# Patient Record
Sex: MALE | Race: WHITE | Employment: FULL TIME | URBAN - METROPOLITAN AREA
[De-identification: names, ages, dates, MRNs, and addresses within clinical notes are randomized per-mention and may not be internally consistent; named-entity substitution may affect disease eponyms.]

---

## 2018-06-27 ENCOUNTER — HOSPITAL ENCOUNTER (OUTPATIENT)
Dept: MRI IMAGING | Age: 53
Discharge: HOME | End: 2018-06-27
Attending: ORTHOPAEDIC SURGERY

## 2018-06-27 DIAGNOSIS — M47.892 OTHER SPONDYLOSIS, CERVICAL REGION: ICD-10-CM

## 2018-06-27 DIAGNOSIS — M54.12 BRACHIAL NEURITIS: ICD-10-CM

## 2018-07-08 ENCOUNTER — APPOINTMENT (OUTPATIENT)
Dept: CT IMAGING | Age: 53
End: 2018-07-08
Attending: EMERGENCY MEDICINE
Payer: COMMERCIAL

## 2018-07-08 ENCOUNTER — HOSPITAL ENCOUNTER (EMERGENCY)
Age: 53
Discharge: HOME | End: 2018-07-08
Attending: EMERGENCY MEDICINE | Admitting: EMERGENCY MEDICINE
Payer: COMMERCIAL

## 2018-07-08 VITALS
HEART RATE: 95 BPM | TEMPERATURE: 98.1 F | WEIGHT: 153 LBS | DIASTOLIC BLOOD PRESSURE: 76 MMHG | RESPIRATION RATE: 16 BRPM | OXYGEN SATURATION: 95 % | BODY MASS INDEX: 24.59 KG/M2 | HEIGHT: 66 IN | SYSTOLIC BLOOD PRESSURE: 119 MMHG

## 2018-07-08 DIAGNOSIS — R51.9 ACUTE NONINTRACTABLE HEADACHE, UNSPECIFIED HEADACHE TYPE: Primary | ICD-10-CM

## 2018-07-08 PROCEDURE — 70450 CT HEAD/BRAIN W/O DYE: CPT

## 2018-07-08 PROCEDURE — 96374 THER/PROPH/DIAG INJ IV PUSH: CPT

## 2018-07-08 PROCEDURE — 99284 EMERGENCY DEPT VISIT MOD MDM: CPT

## 2018-07-08 PROCEDURE — 96361 HYDRATE IV INFUSION ADD-ON: CPT

## 2018-07-08 PROCEDURE — 96375 TX/PRO/DX INJ NEW DRUG ADDON: CPT

## 2018-07-08 PROCEDURE — 74011250636 HC RX REV CODE- 250/636: Performed by: EMERGENCY MEDICINE

## 2018-07-08 RX ORDER — KETOROLAC TROMETHAMINE 30 MG/ML
30 INJECTION, SOLUTION INTRAMUSCULAR; INTRAVENOUS
Status: COMPLETED | OUTPATIENT
Start: 2018-07-08 | End: 2018-07-08

## 2018-07-08 RX ORDER — DIPHENHYDRAMINE HYDROCHLORIDE 50 MG/ML
25 INJECTION, SOLUTION INTRAMUSCULAR; INTRAVENOUS
Status: COMPLETED | OUTPATIENT
Start: 2018-07-08 | End: 2018-07-08

## 2018-07-08 RX ORDER — METOCLOPRAMIDE HYDROCHLORIDE 5 MG/ML
10 INJECTION INTRAMUSCULAR; INTRAVENOUS
Status: COMPLETED | OUTPATIENT
Start: 2018-07-08 | End: 2018-07-08

## 2018-07-08 RX ORDER — DEXAMETHASONE SODIUM PHOSPHATE 4 MG/ML
10 INJECTION, SOLUTION INTRA-ARTICULAR; INTRALESIONAL; INTRAMUSCULAR; INTRAVENOUS; SOFT TISSUE
Status: COMPLETED | OUTPATIENT
Start: 2018-07-08 | End: 2018-07-08

## 2018-07-08 RX ADMIN — KETOROLAC TROMETHAMINE 30 MG: 30 INJECTION INTRAMUSCULAR; INTRAVENOUS at 16:06

## 2018-07-08 RX ADMIN — DEXAMETHASONE SODIUM PHOSPHATE 10 MG: 4 INJECTION, SOLUTION INTRAMUSCULAR; INTRAVENOUS at 15:06

## 2018-07-08 RX ADMIN — DIPHENHYDRAMINE HYDROCHLORIDE 25 MG: 50 INJECTION, SOLUTION INTRAMUSCULAR; INTRAVENOUS at 15:04

## 2018-07-08 RX ADMIN — METOCLOPRAMIDE 10 MG: 5 INJECTION, SOLUTION INTRAMUSCULAR; INTRAVENOUS at 15:02

## 2018-07-08 RX ADMIN — SODIUM CHLORIDE 1000 ML: 900 INJECTION, SOLUTION INTRAVENOUS at 15:01

## 2018-07-08 NOTE — DISCHARGE INSTRUCTIONS

## 2018-07-08 NOTE — ED PROVIDER NOTES
HPI Comments: 46 y.o. male with no significant past medical history who presents via private vehicle from home with chief complaint of a headache. Patient reports constant, \"3/10\" headache since 6/29/18 - some relief when supine. Patient has seen his PCP for the same and states he has a CT and MRI scheduled for Friday (7/13/18), but states that's \"too long to wait. \" Patient states he is concerned because he \"never gets headaches. \" Patient is compliant with [facility] through [facility] for recent neck issues. Patient also notes receiving a \"shot\" for neck inflammation at his PCP yesterday (7/7/18) for related pain, and notes some relief. Patient endorses Hx of arthritis, but states a recent medication change has it \"under control. \" Patient denies recent surgical procedures. Patient denies numbness, tingling, fever, and chills. There are no other acute medical concerns at this time. PCP: Mavis Galvan MD    Note written by Abril Nieves, as dictated by Sim Barger. Hilton Moreira MD 2:42 PM    The history is provided by the patient. No  was used. No past medical history on file. No past surgical history on file. No family history on file. Social History     Social History    Marital status: LEGALLY      Spouse name: N/A    Number of children: N/A    Years of education: N/A     Occupational History    Not on file. Social History Main Topics    Smoking status: Not on file    Smokeless tobacco: Not on file    Alcohol use Not on file    Drug use: Not on file    Sexual activity: Not on file     Other Topics Concern    Not on file     Social History Narrative         ALLERGIES: Review of patient's allergies indicates no known allergies. Review of Systems   Constitutional: Negative for chills and fever. HENT: Negative for ear pain and sore throat. Eyes: Negative for pain. Respiratory: Negative for chest tightness and shortness of breath. Cardiovascular: Negative for chest pain and leg swelling. Gastrointestinal: Negative for abdominal pain, nausea and vomiting. Genitourinary: Negative for dysuria and flank pain. Musculoskeletal: Positive for neck pain and neck stiffness. Negative for back pain. Skin: Negative for rash. Neurological: Positive for headaches. Negative for numbness. All other systems reviewed and are negative. Vitals:    07/08/18 1424   BP: (!) 140/110   Pulse: 95   Resp: 16   Temp: 98.1 °F (36.7 °C)   SpO2: 96%   Weight: 69.4 kg (153 lb)   Height: 5' 6\" (1.676 m)            Physical Exam   Constitutional: No distress. HENT:   Head: Normocephalic and atraumatic. Eyes: Conjunctivae are normal. Pupils are equal, round, and reactive to light. No scleral icterus. Neck: No tracheal deviation present. Cardiovascular: Normal rate and regular rhythm. Pulmonary/Chest: Effort normal and breath sounds normal. No stridor. No respiratory distress. Abdominal: Soft. He exhibits no distension. There is no tenderness. Musculoskeletal: He exhibits no edema or deformity. Neurological: He is alert. Skin: Skin is warm and dry. Psychiatric: He has a normal mood and affect. Nursing note and vitals reviewed. Note written by Abril Montgomery, as dictated by Tatum Saini. Mahsa Plascencia MD 2:42 PM    MDM  Number of Diagnoses or Management Options  Acute nonintractable headache, unspecified headache type:   Diagnosis management comments: 79-year-old male presents with several days of a headache with no red flags. Had a CT scan ordered by his primary care doctor Will obtain here. -CT unremarkable. -Given migraine cocktail with relief of his headache.  -Referred to neurology. Patient Progress  Patient progress: resolved        ED Course       Procedures      Tatum Saini.  Mahsa Plascencia MD

## 2018-07-08 NOTE — ED TRIAGE NOTES
Patient c/o headache since the end of June, states he has seen his primary care doctor for the same, states he has a ct scan of the head and mri of the neck scheduled for Friday but states that's too long for him to wait.

## 2018-07-19 ENCOUNTER — OFFICE VISIT (OUTPATIENT)
Dept: NEUROLOGY | Age: 53
End: 2018-07-19

## 2018-07-19 VITALS
HEIGHT: 66 IN | WEIGHT: 153 LBS | BODY MASS INDEX: 24.59 KG/M2 | DIASTOLIC BLOOD PRESSURE: 92 MMHG | OXYGEN SATURATION: 95 % | HEART RATE: 89 BPM | SYSTOLIC BLOOD PRESSURE: 138 MMHG

## 2018-07-19 DIAGNOSIS — G44.201 ACUTE INTRACTABLE TENSION-TYPE HEADACHE: Primary | ICD-10-CM

## 2018-07-19 RX ORDER — INFLIXIMAB 100 MG/10ML
5 INJECTION, POWDER, LYOPHILIZED, FOR SOLUTION INTRAVENOUS ONCE
COMMUNITY
End: 2022-07-01

## 2018-07-19 NOTE — MR AVS SNAPSHOT
[address] [place] [address] [address] [facility] [id number] [phone] Patient: Steffi Cardenas MRN: [id number] :1965 Visit Information Date & Time Provider Department Dept. Phone Encounter #  
 2018  8:00 AM Long Kline MD [facility]rology [facility] [phone] [id number] Follow-up Instructions Return if symptoms worsen or fail to improve. Upcoming Health Maintenance Date Due Hepatitis C Screening 1965 DTaP/Tdap/Td series (1 - Tdap) 1985 FOBT Q 1 YEAR AGE 50-75 2014 Influenza Age 5 to Adult 2018 Allergies as of 2018  Review Complete On: 2018 By: Kadeem Aguilar LPN No Known Allergies Current Immunizations  Never Reviewed No immunizations on file. Not reviewed this visit You Were Diagnosed With   
  
 Codes Comments Acute intractable tension-type headache    -  Primary ICD-10-CM: C30.795 ICD-9-CM: 339.10 Vitals BP Pulse Height(growth percentile) Weight(growth percentile) SpO2 BMI  
 (!) 138/92 89 5' 6\" (1.676 m) 153 lb (69.4 kg) 95% 24.69 kg/m2 Smoking Status Never Smoker Vitals History BMI and BSA Data Body Mass Index Body Surface Area  
 24.69 kg/m 2 1.8 m 2 Preferred Pharmacy Pharmacy Name Phone [facility] [address], [address] [place] [address] [phone] Your Updated Medication List  
  
   
This list is accurate as of 18  8:45 AM.  Always use your most recent med list.  
  
  
  
  
 REMICADE 100 mg injection Generic drug:  inFLIXimab  
5 mg/kg by IntraVENous route once. Follow-up Instructions Return if symptoms worsen or fail to improve. Introducing [facility]! Dear Nayan Fox: Thank you for requesting a [employer] account.   Our records indicate that you already have an active [employer] account. You can access your account anytime at [email or web address] Did you know that you can access your hospital and ER discharge instructions at any time in [employer]? You can also review all of your test results from your hospital stay or ER visit. Additional Information If you have questions, please visit the Frequently Asked Questions section of the [employer] website at [email or web address] Remember, [employer] is NOT to be used for urgent needs. For medical emergencies, dial 911. Now available from your iPhone and Android! Please provide this summary of care documentation to your next provider. Your primary care clinician is listed as Martyn Buerger. If you have any questions after today's visit, please call [phone].

## 2018-07-19 NOTE — PROGRESS NOTES
Name: Mandie Simmons      :  1965    PCP:   Lisa Stewart MD      Referring:  Devika Blount, ER/ [facility]  MRN:   [id number]    Chief Complaint:   Chief Complaint   Patient presents with    Headache       HISTORY OF PRESENT ILLNESS:     This is a 46 y.o. male with hx of psoriatic arthritis, who presents for evaluation of new onset headache. Pt reports that he started getting frequent headaches at the end of , after getting into a tense discussion with his doctoral advisor. He also describes other recent stressors (recent divorce, relocation from [place] to [place]). Describes head pain as across upper neck/ base of head, or across forehead and around/ behind eyes. Has hard time describing pain. Might feel tight at times. No vomiting, only 1 episode of nausea, no light sensitivity, no sound sensitivity. Duration: whole day. Better/ goes away when he lies down. Tried Diclofenac and that seemed more helpful than flexeril. He also describes some neck pain and left shoulder pain that's been present since around 2018 and he's working with PT for that. Reviewed recent CT Head images (18): normal.       Complete Review of Systems: + frequent headaches, joint pian, snoring; otherwise as noted in HPI     No Known Allergies  History reviewed. No pertinent past medical history. History reviewed. No pertinent surgical history. Family History   Problem Relation Age of Onset    Heart Disease Mother      Social History     Social History    Marital status: LEGALLY      Spouse name: N/A    Number of children: N/A    Years of education: N/A     Occupational History    Not on file.      Social History Main Topics    Smoking status: Never Smoker    Smokeless tobacco: Never Used    Alcohol use Not on file    Drug use: Not on file    Sexual activity: Not on file     Other Topics Concern    Not on file     Social History Narrative    No narrative on file       PHYSICAL EXAM  Vitals: 07/19/18 0811   BP: (!) 138/92   Pulse: 89   SpO2: 95%   Weight: 69.4 kg (153 lb)   Height: 5' 6\" (1.676 m)       General:  Alert, cooperative, NAD   Head:  Normocephalic, atraumatic. Eyes:  Conjunctivae/corneas clear   Lungs:  Heart:  Non labored breathing  Regular rate, rhythm   Extremities: No edema. Skin: No rashes    Neurologic Exam       Language: normal  Memory:  Alert, oriented to person, place, situation    Cranial Nerves:  I: smell Not tested   II: visual fields Full to confrontation   II: pupils Equal, round, reactive to light   II: optic disc No papilledema   III,VII: ptosis none   III,IV,VI: extraocular muscles  normal   V: facial light touch sensation  normal   VII: facial muscle function  symmetric   VIII: hearing symmetric   IX: soft palate elevation  normal   XI: sternocleidomastoid strength 5/5   XII: tongue  midline      Motor: normal bulk, tone, strength in all exts  Sensory: intact to LT, PP, temp, vibration x 4 exts   Cerebellar: no rest, postural, or intention tremor  Normal FNF and H-Shin bilaterally  Reflexes: 2+ throughout  Plantar response: neutral bilaterally    Gait: normal gait including tandem  Romberg negative     Reviewed recent ER note from 7-8-18    ASSESSMENT AND PLAN    ICD-10-CM ICD-9-CM    1. Acute intractable tension-type headache G44.201 339.10        D/w patient CT head normal, neurologic exam normal, and history is consistent with tension headache from ongoing stressors. No features to suggest migraine or any concerning intracranial abnormalities. He reported that his blood pressure has been elevated for a while but hasn't been started on HTN meds. I advised him to f/u with PCP regarding the HTN and if he needs to start on a med, then beta-blocker might help reduce headache frequency in addition to controlling blood pressure. No regular neurology follow up needed. Welcome to return for any new neurologic issues.      Signed By: Chau Swain MD     July 19, 2018

## 2018-08-09 ENCOUNTER — HOSPITAL ENCOUNTER (OUTPATIENT)
Dept: MRI IMAGING | Age: 53
Discharge: HOME | End: 2018-08-09
Attending: ORTHOPAEDIC SURGERY
Payer: COMMERCIAL

## 2018-08-09 DIAGNOSIS — M54.12 CERVICAL RADICULITIS: ICD-10-CM

## 2018-08-09 DIAGNOSIS — M75.41 IMPINGEMENT SYNDROME OF RIGHT SHOULDER: ICD-10-CM

## 2018-08-09 DIAGNOSIS — M67.912 DISORDER OF ROTATOR CUFF OF BOTH SHOULDERS: ICD-10-CM

## 2018-08-09 DIAGNOSIS — M47.892 OTHER SPONDYLOSIS, CERVICAL REGION: ICD-10-CM

## 2018-08-09 DIAGNOSIS — M67.911 DISORDER OF ROTATOR CUFF OF BOTH SHOULDERS: ICD-10-CM

## 2018-08-09 PROCEDURE — 72141 MRI NECK SPINE W/O DYE: CPT

## 2018-08-09 PROCEDURE — 73221 MRI JOINT UPR EXTREM W/O DYE: CPT

## 2018-10-31 ENCOUNTER — HOSPITAL ENCOUNTER (OUTPATIENT)
Dept: MRI IMAGING | Age: 53
Discharge: HOME | End: 2018-10-31
Attending: ORTHOPAEDIC SURGERY
Payer: COMMERCIAL

## 2018-10-31 DIAGNOSIS — M75.32 CALCIFIC TENDINITIS OF LEFT SHOULDER: ICD-10-CM

## 2018-10-31 DIAGNOSIS — M67.912 DISORDER OF LEFT ROTATOR CUFF: ICD-10-CM

## 2018-10-31 DIAGNOSIS — M19.012 ARTHRITIS OF LEFT SHOULDER REGION: ICD-10-CM

## 2018-10-31 PROCEDURE — 73221 MRI JOINT UPR EXTREM W/O DYE: CPT

## 2020-04-15 ENCOUNTER — HOSPITAL ENCOUNTER (EMERGENCY)
Age: 55
Discharge: HOME | End: 2020-04-15
Attending: EMERGENCY MEDICINE | Admitting: EMERGENCY MEDICINE
Payer: COMMERCIAL

## 2020-04-15 ENCOUNTER — APPOINTMENT (OUTPATIENT)
Dept: CT IMAGING | Age: 55
End: 2020-04-15
Attending: EMERGENCY MEDICINE
Payer: COMMERCIAL

## 2020-04-15 VITALS
DIASTOLIC BLOOD PRESSURE: 93 MMHG | HEART RATE: 93 BPM | SYSTOLIC BLOOD PRESSURE: 162 MMHG | HEIGHT: 66 IN | RESPIRATION RATE: 18 BRPM | TEMPERATURE: 97.7 F | OXYGEN SATURATION: 99 % | WEIGHT: 160 LBS | BODY MASS INDEX: 25.71 KG/M2

## 2020-04-15 DIAGNOSIS — N20.0 KIDNEY STONE: Primary | ICD-10-CM

## 2020-04-15 LAB
ALBUMIN SERPL-MCNC: 4.3 G/DL (ref 3.5–5)
ALBUMIN/GLOB SERPL: 1.2 {RATIO} (ref 1.1–2.2)
ALP SERPL-CCNC: 105 U/L (ref 45–117)
ALT SERPL-CCNC: 33 U/L (ref 12–78)
ANION GAP SERPL CALC-SCNC: 4 MMOL/L (ref 5–15)
APPEARANCE UR: CLEAR
AST SERPL-CCNC: 26 U/L (ref 15–37)
BACTERIA URNS QL MICRO: NEGATIVE /HPF
BASOPHILS # BLD: 0.1 K/UL (ref 0–0.1)
BASOPHILS NFR BLD: 1 % (ref 0–1)
BILIRUB SERPL-MCNC: 0.6 MG/DL (ref 0.2–1)
BILIRUB UR QL: NEGATIVE
BUN SERPL-MCNC: 14 MG/DL (ref 6–20)
BUN/CREAT SERPL: 11 (ref 12–20)
CALCIUM SERPL-MCNC: 9.3 MG/DL (ref 8.5–10.1)
CHLORIDE SERPL-SCNC: 105 MMOL/L (ref 97–108)
CO2 SERPL-SCNC: 27 MMOL/L (ref 21–32)
COLOR UR: NORMAL
COMMENT, HOLDF: NORMAL
CREAT SERPL-MCNC: 1.22 MG/DL (ref 0.7–1.3)
DIFFERENTIAL METHOD BLD: NORMAL
EOSINOPHIL # BLD: 0.1 K/UL (ref 0–0.4)
EOSINOPHIL NFR BLD: 1 % (ref 0–7)
EPITH CASTS URNS QL MICRO: NORMAL /LPF
ERYTHROCYTE [DISTWIDTH] IN BLOOD BY AUTOMATED COUNT: 13.2 % (ref 11.5–14.5)
GLOBULIN SER CALC-MCNC: 3.7 G/DL (ref 2–4)
GLUCOSE SERPL-MCNC: 104 MG/DL (ref 65–100)
GLUCOSE UR STRIP.AUTO-MCNC: NEGATIVE MG/DL
HCT VFR BLD AUTO: 45.9 % (ref 36.6–50.3)
HGB BLD-MCNC: 15.8 G/DL (ref 12.1–17)
HGB UR QL STRIP: NEGATIVE
IMM GRANULOCYTES # BLD AUTO: 0 K/UL (ref 0–0.04)
IMM GRANULOCYTES NFR BLD AUTO: 0 % (ref 0–0.5)
KETONES UR QL STRIP.AUTO: NEGATIVE MG/DL
LEUKOCYTE ESTERASE UR QL STRIP.AUTO: NEGATIVE
LYMPHOCYTES # BLD: 2 K/UL (ref 0.8–3.5)
LYMPHOCYTES NFR BLD: 25 % (ref 12–49)
MCH RBC QN AUTO: 30.9 PG (ref 26–34)
MCHC RBC AUTO-ENTMCNC: 34.4 G/DL (ref 30–36.5)
MCV RBC AUTO: 89.6 FL (ref 80–99)
MONOCYTES # BLD: 0.7 K/UL (ref 0–1)
MONOCYTES NFR BLD: 9 % (ref 5–13)
NEUTS SEG # BLD: 5.1 K/UL (ref 1.8–8)
NEUTS SEG NFR BLD: 64 % (ref 32–75)
NITRITE UR QL STRIP.AUTO: NEGATIVE
NRBC # BLD: 0 K/UL (ref 0–0.01)
NRBC BLD-RTO: 0 PER 100 WBC
PH UR STRIP: 6.5 [PH] (ref 5–8)
PLATELET # BLD AUTO: 314 K/UL (ref 150–400)
PMV BLD AUTO: 9.3 FL (ref 8.9–12.9)
POTASSIUM SERPL-SCNC: 4 MMOL/L (ref 3.5–5.1)
PROT SERPL-MCNC: 8 G/DL (ref 6.4–8.2)
PROT UR STRIP-MCNC: NEGATIVE MG/DL
RBC # BLD AUTO: 5.12 M/UL (ref 4.1–5.7)
RBC #/AREA URNS HPF: NORMAL /HPF (ref 0–5)
SAMPLES BEING HELD,HOLD: NORMAL
SODIUM SERPL-SCNC: 136 MMOL/L (ref 136–145)
SP GR UR REFRACTOMETRY: <1.005 (ref 1–1.03)
UR CULT HOLD, URHOLD: NORMAL
UROBILINOGEN UR QL STRIP.AUTO: 0.2 EU/DL (ref 0.2–1)
WBC # BLD AUTO: 7.9 K/UL (ref 4.1–11.1)
WBC URNS QL MICRO: NORMAL /HPF (ref 0–4)

## 2020-04-15 PROCEDURE — 99283 EMERGENCY DEPT VISIT LOW MDM: CPT

## 2020-04-15 PROCEDURE — 81001 URINALYSIS AUTO W/SCOPE: CPT

## 2020-04-15 PROCEDURE — 36415 COLL VENOUS BLD VENIPUNCTURE: CPT

## 2020-04-15 PROCEDURE — 74176 CT ABD & PELVIS W/O CONTRAST: CPT

## 2020-04-15 PROCEDURE — 85025 COMPLETE CBC W/AUTO DIFF WBC: CPT

## 2020-04-15 PROCEDURE — 80053 COMPREHEN METABOLIC PANEL: CPT

## 2020-04-15 RX ORDER — ONDANSETRON 4 MG/1
4 TABLET, ORALLY DISINTEGRATING ORAL
Qty: 10 TAB | Refills: 0 | Status: SHIPPED | OUTPATIENT
Start: 2020-04-15 | End: 2022-07-01

## 2020-04-15 RX ORDER — OXYCODONE AND ACETAMINOPHEN 5; 325 MG/1; MG/1
1 TABLET ORAL
Qty: 20 TAB | Refills: 0 | Status: SHIPPED | OUTPATIENT
Start: 2020-04-15 | End: 2020-04-22

## 2020-04-15 NOTE — ED NOTES
Boo RN-    Discussed the importance of follow up care and seeking immediate medical attention for any changes or worsening in the patients condition. Patient verbalized understanding of discharge paperwork, medication use and follow up care.      Patient discharged with scripts for Zofran and Percocet

## 2020-04-15 NOTE — ED NOTES
Bedside shift change report given to Graciela Rios RN and Carroll Roth RN (oncoming nurse) by Yusef Hilton RN (offgoing nurse). Report included the following information SBAR, Kardex, ED Summary and Recent Results.

## 2020-04-15 NOTE — ED TRIAGE NOTES
Pt reports kidney stone like pain since 1am. Pt reports right sided pain.  Denies fevers, blood in urine  Hx of stones

## 2020-04-15 NOTE — ED PROVIDER NOTES
Mr. Beatrice Ceballos is a 79-year-old male who presents to the ER with complaints of pain is consistent with his previous kidney stones. He said that earlier this morning, he woke up with pain in his penis. He described as a burning sensation. He thought that he may have a urinary tract infection. He had several episodes of pain that the morning. His pain in his penis is resolved. He now only has pain in his right flank. This feels like the same pain that is had with previous kidney stones. He was seen by a doctor via telehealth earlier today. He was written a prescription of antibiotics for possible UTI. He said that he has not yet gotten them filled. He came to the ER because having these waves of pain. He said his pain is currently very minimal in the ER. No fevers or chills. Reports some nausea but no vomiting. No changes with his bowel movements. He denies any other complaints. No past medical history on file. No past surgical history on file.       Family History:   Problem Relation Age of Onset    Heart Disease Mother        Social History     Socioeconomic History    Marital status: LEGALLY      Spouse name: Not on file    Number of children: Not on file    Years of education: Not on file    Highest education level: Not on file   Occupational History    Not on file   Social Needs    Financial resource strain: Not on file    Food insecurity     Worry: Not on file     Inability: Not on file    Transportation needs     Medical: Not on file     Non-medical: Not on file   Tobacco Use    Smoking status: Never Smoker    Smokeless tobacco: Never Used   Substance and Sexual Activity    Alcohol use: Not on file    Drug use: Not on file    Sexual activity: Not on file   Lifestyle    Physical activity     Days per week: Not on file     Minutes per session: Not on file    Stress: Not on file   Relationships    Social connections     Talks on phone: Not on file     Gets together: Not on file     Attends [employer] service: Not on file     Active member of club or organization: Not on file     Attends meetings of clubs or organizations: Not on file     Relationship status: Not on file    Intimate partner violence     Fear of current or ex partner: Not on file     Emotionally abused: Not on file     Physically abused: Not on file     Forced sexual activity: Not on file   Other Topics Concern    Not on file   Social History Narrative    Not on file         ALLERGIES: Cherry    Review of Systems   Constitutional: Negative for chills and fever. HENT: Negative for rhinorrhea and sore throat. Respiratory: Negative for cough and shortness of breath. Cardiovascular: Negative for chest pain. Gastrointestinal: Negative for abdominal pain, diarrhea, nausea and vomiting. Genitourinary: Positive for dysuria and flank pain. Negative for hematuria. Musculoskeletal: Negative for arthralgias and myalgias. Skin: Negative for pallor and rash. Neurological: Negative for dizziness, weakness and light-headedness. All other systems reviewed and are negative. Vitals:    04/15/20 1057   BP: (!) 162/93   Pulse: 93   Resp: 18   Temp: 97.7 °F (36.5 °C)   SpO2: 99%   Weight: 72.6 kg (160 lb)   Height: 5' 6\" (1.676 m)            Physical Exam     Vital signs reviewed. Nursing notes reviewed.     Const:  No acute distress, well developed, well nourished  Head:  Atraumatic, normocephalic  Eyes:  PERRL, conjunctiva normal, no scleral icterus  Neck:  Supple, trachea midline  Cardiovascular:  RRR, no murmurs, no gallops, no rubs  Resp:  No resp distress, no increased work of breathing, no wheezes, no rhonchi, no rales,  Abd:  Soft, non-tender, non-distended, no rebound, no guarding, no CVA tenderness  MSK:  No pedal edema, normal ROM  Neuro:  Alert and oriented x3, no cranial nerve defect  Skin:  Warm, dry, intact  Psych: normal mood and affect, behavior is normal, judgement and thought content is normal          MDM  Number of Diagnoses or Management Options  Kidney stone:      Amount and/or Complexity of Data Reviewed  Clinical lab tests: ordered and reviewed  Tests in the radiology section of CPT®: ordered and reviewed  Review and summarize past medical records: yes    Patient Progress  Patient progress: stable         Mr. Anamaria Landis is a 21-year-old male who presents to the ER with complaints of right flank pain. He has a history of kidney stones. He has a kidney stone today. No renal failure. No UTI. He is to follow-up with a urologist or return to the ER with any new or worsening. I will start him on Percocet and Zofran as needed.       Procedures

## 2020-09-24 ENCOUNTER — HOSPITAL ENCOUNTER (OUTPATIENT)
Dept: MRI IMAGING | Age: 55
Discharge: HOME | End: 2020-09-24
Attending: ORTHOPAEDIC SURGERY
Payer: COMMERCIAL

## 2020-09-24 DIAGNOSIS — M75.42 IMPINGEMENT SYNDROME OF LEFT SHOULDER: ICD-10-CM

## 2020-09-24 DIAGNOSIS — M67.912 DISORDER OF LEFT ROTATOR CUFF: ICD-10-CM

## 2020-09-24 PROCEDURE — 73221 MRI JOINT UPR EXTREM W/O DYE: CPT

## 2022-03-19 PROBLEM — G44.201 ACUTE INTRACTABLE TENSION-TYPE HEADACHE: Status: ACTIVE | Noted: 2018-07-19

## 2022-07-01 ENCOUNTER — OFFICE VISIT (OUTPATIENT)
Dept: NEUROLOGY | Age: 57
End: 2022-07-01
Payer: COMMERCIAL

## 2022-07-01 VITALS
DIASTOLIC BLOOD PRESSURE: 83 MMHG | BODY MASS INDEX: 25.82 KG/M2 | RESPIRATION RATE: 16 BRPM | HEART RATE: 90 BPM | TEMPERATURE: 98.1 F | OXYGEN SATURATION: 96 % | SYSTOLIC BLOOD PRESSURE: 132 MMHG | WEIGHT: 160 LBS

## 2022-07-01 DIAGNOSIS — R20.2 NUMBNESS AND TINGLING OF RIGHT ARM: Primary | ICD-10-CM

## 2022-07-01 DIAGNOSIS — H53.2 DIPLOPIA: ICD-10-CM

## 2022-07-01 DIAGNOSIS — R42 VERTIGO: ICD-10-CM

## 2022-07-01 DIAGNOSIS — F40.240 CLAUSTROPHOBIA: ICD-10-CM

## 2022-07-01 DIAGNOSIS — R20.0 NUMBNESS AND TINGLING OF RIGHT ARM: Primary | ICD-10-CM

## 2022-07-01 PROCEDURE — 99204 OFFICE O/P NEW MOD 45 MIN: CPT | Performed by: PSYCHIATRY & NEUROLOGY

## 2022-07-01 RX ORDER — TRAZODONE HYDROCHLORIDE 100 MG/1
100 TABLET ORAL
COMMUNITY
Start: 2021-08-02

## 2022-07-01 RX ORDER — MECLIZINE HYDROCHLORIDE 25 MG/1
TABLET ORAL
COMMUNITY
Start: 2022-06-10

## 2022-07-01 RX ORDER — DIAZEPAM 10 MG/1
TABLET ORAL
Qty: 1 TABLET | Refills: 0 | Status: SHIPPED | OUTPATIENT
Start: 2022-07-01

## 2022-07-01 RX ORDER — DICLOFENAC SODIUM 75 MG/1
75 TABLET, DELAYED RELEASE ORAL 2 TIMES DAILY
COMMUNITY
Start: 2021-08-09

## 2022-07-01 RX ORDER — AMLODIPINE BESYLATE 5 MG/1
TABLET ORAL
COMMUNITY
Start: 2022-05-03

## 2022-07-01 RX ORDER — ADALIMUMAB 40MG/0.4ML
KIT SUBCUTANEOUS
COMMUNITY
Start: 2022-06-21

## 2022-07-01 NOTE — PATIENT INSTRUCTIONS
RESULT POLICY      If we have ordered testing for you, know that; \"NO NEWS IS GOOD NEWS! \"     It is our policy that we no longer call patients with results, nor do we  give test results over the phone. We schedule follow up appointments so that your results can be discussed in person. This allows you to address any questions you have regarding the results. If you choose to go to an imaging center outside of [facility], it is your responsibility to bring imaging report and disc to follow up appointment. If something of concern is revealed on your test, we will contact you to discuss the matter and if needed schedule a sooner follow up appointment. Additionally, results may be found by using the My Chart feature and one of our patient service representatives at the  can give you instructions on how to access this feature to utilize our electronic medical record system. Thank you for your understanding. [facility]   Statement to Patients  April 1, 2014      In an effort to ensure the large volume of patient prescription refills is processed in the most efficient and expeditious manner, we are asking our patients to assist us by calling your Pharmacy for all prescription refills, this will include also your  Mail Order Pharmacy. The pharmacy will contact our office electronically to continue the refill process. Please do not wait until the last minute to call your pharmacy. We need at least 48 hours (2days) to fill prescriptions. We also encourage you to call your pharmacy before going to  your prescription to make sure it is ready. With regard to controlled substance prescription refill requests (narcotic refills) that need to be picked up at our office, we ask your cooperation by providing us with at least 72 hours (3days) notice that you will need a refill.     We will not refill narcotic prescription refill requests after 4:00pm on any weekday, Monday through Thursday, or after 2:00pm on Fridays, or on the weekends. We encourage everyone to explore another way of getting your prescription refill request processed using [employer], our patient web portal through our electronic medical record system. [employer] is an efficient and effective way to communicate your medication request directly to the office and  downloadable as an kalpesh on your smart phone . [employer] also features a review functionality that allows you to view your medication list as well as leave messages for your physician. Are you ready to get connected? If so please review the attatched instructions or speak to any of our staff to get you set up right away! Thank you so much for your cooperation. Should you have any questions please contact our Practice Administrator.

## 2022-07-01 NOTE — PROGRESS NOTES
[facility] Neurology Clinics and [address] at [facility] Neurology Clinics at [address] [facility] [place], [address] [facility] 9293 555 MAKAYLA Tellez [facility], [address] [place] [address]  [phone] Office  [phone]           Referring: Dean Fuentes [address]  [place],  [address] [facility]    Chief Complaint   Patient presents with    New Patient    Tingling     tingling of fingers of right hand traveling up to right side of face. Started after exercising shoulder at gym    Eye Problem     1st episode lasting about 5 min yesterday. very slight HA with it   58-year-old gentleman who presents today for initial neurologic consultation regarding tingling of the right upper extremity up into the neck. About 4 months ago he was working out in [employer] doing a cable pull of about 30 pounds. He was just finishing when he had the abrupt onset of this tingling sensation that actually went up and down the arm into the neck and into the hand. Since then he has had intermittent numbness and tingling of that arm that seems to go both up and down into the hand and into the neck. Does not seem to go directly from the neck down the arm in a radicular pattern. He has had previous shoulder surgery. He saw his primary physician and orthopedics and has had this in the past and had a Medrol Dosepak however they were uncomfortable with giving him more steroid. They wanted a neurologic evaluation. He has not had any weakness. He feels like the abnormal sensation goes into the first 3 digits. He has not had any weakness. He is left-handed. Not dropping things with the right hand. Leg is fine. Left side is fine. Interestingly he had an episode of vertigo about 3 to 4 weeks ago which is unlike him. Describes spinning of the room. He did see an ENT and things were fine.   In proximity to that he had a hearing test and the audiologist cleared some cerumen. Yesterday interestingly he had an episode of diplopia. He was just sitting at his desk at the abrupt onset of diplopia. Images were skew. It did not make any difference with position. He tried laying down and tried sitting up. He closed his eyes and opened his eyes. He then closed him for about 4 minutes and it passed. He has never had that before. Record review finds an emergency department visit in April 2020 where he presented with pain consistent with previous renal calculus. He had pain in the right flank and penis. His examination found him to be hypertensive. He was diagnosed with kidney stone. He was given Percocet and Zofran and referred to urology  Laboratory analysis from that date  Urinalysis normal  Metabolic panel unremarkable  CBC unremarkable  CT of the abdomen and pelvis with a 2 mm obstructing stone in the lower right ureter    MRI of the left shoulder dated September 17, 2020 with adhesive capsulitis and unchanged partial-thickness articular surface tear of the supraspinatus tendon as well as increased acromioclavicular osteophytes    Past Medical History:   Diagnosis Date    Essential hypertension 2018    Hearing reduced 06/15/2022       History reviewed. No pertinent surgical history. History reviewed. No pertinent surgical history. He has had surgery on both shoulders. Current Outpatient Medications   Medication Sig Dispense Refill    diclofenac EC (VOLTAREN) 75 mg EC tablet Take 75 mg by mouth two (2) times a day.  amLODIPine (NORVASC) 5 mg tablet TAKE ONE AND 1/2 TABLETS BY MOUTH ONCE DAILY 90      traZODone (DESYREL) 100 mg tablet Take 100 mg by mouth.       meclizine (ANTIVERT) 25 mg tablet 1 TABLET AS NEEDED ORALLY 1-3X PER DAY 10 DAYS      KEVIN Chaves, Pen 40 mg/0.4 mL injection pen          Allergies   Allergen Reactions    Cherry Itching       Social History     Tobacco Use    Smoking status: Never Smoker    Smokeless tobacco: Never Used Substance Use Topics    Alcohol use: Yes     Alcohol/week: 1.0 standard drink     Types: 1 Glasses of wine per week    Drug use: Never       Family History   Problem Relation Age of Onset    Heart Disease Mother        Review of Systems  Pertinent positives and negatives as noted. Examination  Visit Vitals  /83 (BP 1 Location: Left upper arm, BP Patient Position: Sitting, BP Cuff Size: Adult)   Pulse 90   Temp 98.1 °F (36.7 °C)   Resp 16   Wt 72.6 kg (160 lb)   SpO2 96%   BMI 25.82 kg/m²     Neurologically, he is awake, alert, oriented with normal speech, language, and cognition. Cranial nerves intact 2-12. He has normal bulk and tone. There is no abnormal movement. There is no pronation or drift. He is able to generate full strength in the upper and lower extremities and all muscle groups to direct confrontational testing. Reflexes are symmetrical in the upper and lower extremities bilaterally. No pathologic reflexes are elicited. He has no ataxia or past pointing. No reproduction of symptoms with palpation of the ulnar groove on the right. He is able to reproduce symptoms by pushing the forearm on the right on the armrest.  Tinel's sign present bilateral wrists. Impression/Plan  18-year-old gentleman with abrupt onset of numbness and tingling in the right upper extremity while using a cable pull at the gym of approximately 30 pounds also now with vertigo and diplopia and certainly the most ominous sequela could be arterial dissection particularly vertebral and therefore we will get an MRI of the brain to evaluate posterior fossa check carotid Doppler and we will also get EMG of the upper extremities  Follow-up after testing    Fernando Hudson MD          This note was created using voice recognition software. Despite editing, there may be syntax errors.

## 2022-07-18 ENCOUNTER — HOSPITAL ENCOUNTER (OUTPATIENT)
Dept: MRI IMAGING | Age: 57
Discharge: HOME | End: 2022-07-18
Attending: PSYCHIATRY & NEUROLOGY

## 2022-07-18 DIAGNOSIS — R20.2 NUMBNESS AND TINGLING OF RIGHT ARM: ICD-10-CM

## 2022-07-18 DIAGNOSIS — H53.2 DIPLOPIA: ICD-10-CM

## 2022-07-18 DIAGNOSIS — R20.0 NUMBNESS AND TINGLING OF RIGHT ARM: ICD-10-CM

## 2022-07-18 DIAGNOSIS — F40.240 CLAUSTROPHOBIA: ICD-10-CM

## 2022-07-18 DIAGNOSIS — R42 VERTIGO: ICD-10-CM

## 2022-09-08 ENCOUNTER — OFFICE VISIT (OUTPATIENT)
Dept: NEUROLOGY | Age: 57
End: 2022-09-08
Payer: COMMERCIAL

## 2022-09-08 DIAGNOSIS — R20.0 NUMBNESS AND TINGLING OF RIGHT ARM: Primary | ICD-10-CM

## 2022-09-08 DIAGNOSIS — R20.2 NUMBNESS AND TINGLING OF RIGHT ARM: Primary | ICD-10-CM

## 2022-09-08 PROCEDURE — 95910 NRV CNDJ TEST 7-8 STUDIES: CPT | Performed by: PSYCHIATRY & NEUROLOGY

## 2022-09-08 PROCEDURE — 95886 MUSC TEST DONE W/N TEST COMP: CPT | Performed by: PSYCHIATRY & NEUROLOGY

## 2022-09-08 NOTE — PROCEDURES
EMG/ NCS Report  [facility]  - [facility]  [facility], [place]  [facility], [address] [place], [address]   Ph: [phone]   FAX: [phone]/ [phone]  Test Date:  2019      Test Date:  2022    Patient: No Akers : 1965 Physician: Renato Freeman MD   Sex: Male Height: ' \" Ref Phys: Kimo Barnard MD   ID#: [id number] Weight:  lbs. Technician: Tawana Anna     Patient History / Exam:  TN:OANKXHUT,BJWNHUDR in the R shoulder after lifting weights radiating to there R hand. States his symptoms have been for the 5 mos. no issues with lt. hand. (-) neck pain    Exam: Awake, alert, follows commands, Motor 5/5    Patient is coming for neuropathy evaluation    EMG & NCV Findings:  Evaluation of the right median motor nerve showed normal distal onset latency (4.2 ms), normal amplitude (10.2 mV), and normal conduction velocity (Elbow-Wrist, 50 m/s). The right ulnar motor nerve showed normal distal onset latency (3.1 ms), normal amplitude (12.9 mV), normal conduction velocity (B Elbow-Wrist, 50 m/s), and normal conduction velocity (A Elbow-B Elbow, 53 m/s). The right median sensory, the right radial sensory, and the right ulnar sensory nerves showed normal distal peak latency (R3.6, R2.3, R3.7 ms) and normal amplitude (R49.0, R52.5, R42.7 µV). The right median/ulnar (palm) comparison nerve showed normal distal onset latency (Median Palm, 1.6 ms), normal distal peak latency (Median Palm, 2.2 ms), normal amplitude (Median Palm, 29.1 µV), normal distal onset latency (Ulnar Palm, 1.4 ms), normal distal peak latency (Ulnar Palm, 2.1 ms), and normal amplitude (Ulnar Palm, 22.0 µV). All F Wave latencies were within normal limits. All examined muscles (as indicated in the following table) showed no evidence of electrical instability.         Impression:    Extensive electrodiagnostic examination of the right upper extremity, including needle examination of the supraspinatus and infraspinatus muscles,  is normal.    Specifically, there is no evidence of a peripheral neuropathy or right cervical motor radiculopathy.         Joy Felton MD  Diplomate, American Board of Psychiatry and Neurology  Diplomate, Neuromuscular Medicine  Diplomate, American Board of Electrodiagnostic Medicine  Director, [address] [place] [address] Accredited Laboratory with Exemplary Status            Nerve Conduction Studies  Anti Sensory Summary Table     Stim Site NR Peak (ms) Norm Peak (ms) P-T Amp (µV) Norm P-T Amp Site1 Site2 Dist (cm)   Right Median Anti Sensory (2nd Digit)  29.6 °C   Wrist    3.6 <4 49.0 >13 Wrist 2nd Digit 14.0   Right Radial Anti Sensory (Base 1st Digit)  29.9 °C   Wrist    2.3 <2.8 52.5 >11 Wrist Base 1st Digit 10.0   Right Ulnar Anti Sensory (5th Digit)  29.8 °C   Wrist    3.7 <4.0 42.7 >9 Wrist 5th Digit 14.0     Motor Summary Table     Stim Site NR Onset (ms) Norm Onset (ms) O-P Amp (mV) Norm O-P Amp Amp (Prev) (%) Site1 Site2 Dist (cm) Dima (m/s) Norm Dima (m/s)   Right Median Motor (Abd Poll Brev)  29.8 °C   Wrist    4.2 <4.5 10.2 >4.1 100.0 Wrist Abd Poll Brev 8.0     Elbow    7.8  8.1  79.4 Elbow Wrist 18.0 50 >49   Right Ulnar Motor (Abd Dig Minimi)  29.6 °C   Wrist    3.1 <3.1 12.9 >7.0 100.0 Wrist Abd Dig Minimi 8.0     B Elbow    7.0  11.6  89.9 B Elbow Wrist 19.5 50 >50   A Elbow    8.9  11.8  101.7 A Elbow B Elbow 10.0 53 >50     Comparison Summary Table     Stim Site NR Peak (ms) P-T Amp (µV) Site1 Site2 Dist (cm) Delta-0 (ms)   Right Median/Ulnar Palm Comparison (Wrist)  29.7 °C   Median Palm    2.2 37.4 Median Palm Ulnar Palm 8.0 0.2   Ulnar Palm    2.1 26.5         F Wave Studies     NR F-Lat (ms) Lat Norm (ms) L-R F-Lat (ms) L-R Lat Norm   Right Ulnar (Mrkrs) (Abd Dig Min)  29.8 °C      29.36 <32  <2.5     EMG     Side Muscle Nerve Root Ins Act Fibs Psw Recrt Duration Amp Poly Comment   Right 1stDorInt Ulnar C8-T1 Nml Nml Nml Nml Nml Nml Nml    Right ExtIndicis Radial (Post Int) C7-8 Nml Nml Nml Nml Nml Nml Nml    Right Abd Poll Brev Median C8-T1 Nml Nml Nml Nml Nml Nml Nml    Right Biceps Musculocut C5-6 Nml Nml Nml Nml Nml Nml Nml    Right Triceps Radial C6-7-8 Nml Nml Nml Nml Nml Nml Nml    Right Deltoid Axillary C5-6 Nml Nml Nml Nml Nml Nml Nml    Right Lower Cerv Parasp Rami C7,T1 Nml Nml Nml Nml Nml Nml Nml    Right Infraspinatus SupraScap C5-6 Nml Nml Nml Nml Nml Nml Nml    Right Supraspinatus SupraScap C5-6 Nml Nml Nml Nml Nml Nml Nml                Nerve Conduction Studies  Anti Sensory Left/Right Comparison     Stim Site L Lat (ms) R Lat (ms) L-R Lat (ms) L Amp (µV) R Amp (µV) L-R Amp (%) Site1 Site2 L Dima (m/s) R Dima (m/s) L-R Dima (m/s)   Median Anti Sensory (2nd Digit)  29.6 °C   Wrist  2.5   49.0  Wrist 2nd Digit  56    Radial Anti Sensory (Base 1st Digit)  29.9 °C   Wrist  1.6   52.5  Wrist Base 1st Digit  63    Ulnar Anti Sensory (5th Digit)  29.8 °C   Wrist  2.8   42.7  Wrist 5th Digit  50      Motor Left/Right Comparison     Stim Site L Lat (ms) R Lat (ms) L-R Lat (ms) L Amp (mV) R Amp (mV) L-R Amp (%) Site1 Site2 L Dima (m/s) R Dima (m/s) L-R Dima (m/s)   Median Motor (Abd Poll Brev)  29.8 °C   Wrist  4.2   10.2  Wrist Abd Poll Brev      Elbow  7.8   8.1  Elbow Wrist  50    Ulnar Motor (Abd Dig Minimi)  29.6 °C   Wrist  3.1   12.9  Wrist Abd Dig Minimi      B Elbow  7.0   11.6  B Elbow Wrist  50    A Elbow  8.9   11.8  A Elbow B Elbow  53      Comparison Left/Right Comparison     Stim Site L Lat (ms) R Lat (ms) L-R Lat (ms) L Amp (µV) R Amp (µV) L-R Amp (%)   Median/Ulnar Palm Comparison (Wrist)  29.7 °C   Median Palm  1.6   29.1    Ulnar Palm  1.4   22.0          Waveforms:

## 2022-09-26 ENCOUNTER — OFFICE VISIT (OUTPATIENT)
Dept: NEUROLOGY | Age: 57
End: 2022-09-26
Payer: COMMERCIAL

## 2022-09-26 VITALS
SYSTOLIC BLOOD PRESSURE: 142 MMHG | WEIGHT: 160 LBS | BODY MASS INDEX: 25.71 KG/M2 | HEART RATE: 77 BPM | HEIGHT: 66 IN | OXYGEN SATURATION: 98 % | DIASTOLIC BLOOD PRESSURE: 88 MMHG

## 2022-09-26 DIAGNOSIS — R42 VERTIGO: ICD-10-CM

## 2022-09-26 DIAGNOSIS — R20.0 NUMBNESS AND TINGLING OF RIGHT ARM: Primary | ICD-10-CM

## 2022-09-26 DIAGNOSIS — H53.2 DIPLOPIA: ICD-10-CM

## 2022-09-26 DIAGNOSIS — R20.2 NUMBNESS AND TINGLING OF RIGHT ARM: Primary | ICD-10-CM

## 2022-09-26 PROCEDURE — 99213 OFFICE O/P EST LOW 20 MIN: CPT | Performed by: PSYCHIATRY & NEUROLOGY

## 2022-09-26 NOTE — PROGRESS NOTES
Chief Complaint   Patient presents with    Follow-up     Patient is here for MRI, EMG and doppler results      Visit Vitals  BP (!) 142/88   Pulse 77   Ht 5' 6\" (1.676 m)   Wt 160 lb (72.6 kg)   SpO2 98%   BMI 25.82 kg/m²     Patient states that he is still having the tingling in his shoulder but not as bad or as much

## 2022-09-26 NOTE — PROGRESS NOTES
[address] [facility] Neurology Clinics and [address] at [facility] Neurology Clinics at [facility] [address], [address] [facility] [address] [facility], [address] [place] [address]   [phone]              Chief Complaint   Patient presents with    Follow-up     Patient is here for MRI, EMG and doppler results      Current Outpatient Medications   Medication Sig Dispense Refill    diclofenac EC (VOLTAREN) 75 mg EC tablet Take 75 mg by mouth two (2) times a day. traZODone (DESYREL) 100 mg tablet Take 100 mg by mouth. Humira,CF, Pen 40 mg/0.4 mL injection pen       amLODIPine (NORVASC) 5 mg tablet TAKE ONE AND 1/2 TABLETS BY MOUTH ONCE DAILY 90      meclizine (ANTIVERT) 25 mg tablet 1 TABLET AS NEEDED ORALLY 1-3X PER DAY 10 DAYS (Patient not taking: Reported on 9/26/2022)      diazePAM (Valium) 10 mg tablet 1 po 45minutes prior to MRI--Needs  (Patient not taking: Reported on 9/26/2022) 1 Tablet 0      Allergies   Allergen Reactions    Cherry Itching     Social History     Tobacco Use    Smoking status: Never    Smokeless tobacco: Never   Substance Use Topics    Alcohol use: Yes     Alcohol/week: 1.0 standard drink     Types: 1 Glasses of wine per week    Drug use: Never   58-year-old gentleman returns today for follow-up after his recent initial consultation regarding tingling in the right upper extremity after working out at the gym also with complaints of vertigo and diplopia. We sent him for several tests  MRI of the brain has not been performed. He notes that with the claustrophobia he really was just unable to do that  Carotid Doppler was normal  EMG was done by one of our neuromuscular experts and was normal  He has not had any further vertigo or diplopia.   He continues to workout at the gym but is doing less weight and the right upper extremity symptoms are getting better    Examination  Visit Vitals  BP (!) 142/88   Pulse 77   Ht 5' 6\" (1.676 m)   Wt 72.6 kg (160 lb)   SpO2 98%   BMI 25.82 kg/m²   Very pleasant gentleman. Awake, alert and oriented. No icterus. CN intact 2-12 without nystagmus. No pronation or drift. Resists fully in all 4 extrems. DTR symmetric in all 4 extremities. No ataxia. Steady gait. Impression/Plan  Symptoms as described following heavy workout in the gym improving  I am reassured by his stable exam as well as by his tests as above  Given that he is not having any further vertigo or diplopia we will defer having to put him to sleep to do the MRI  He will let me know if the symptoms recur or if he has any other need    Mane Crane MD        This note was created using voice recognition software. Despite editing, there may be syntax errors.

## 2024-09-13 ENCOUNTER — APPOINTMENT (OUTPATIENT)
Facility: HOSPITAL | Age: 59
End: 2024-09-13
Payer: COMMERCIAL

## 2024-09-13 ENCOUNTER — HOSPITAL ENCOUNTER (EMERGENCY)
Facility: HOSPITAL | Age: 59
Discharge: HOME | End: 2024-09-13
Attending: EMERGENCY MEDICINE
Payer: COMMERCIAL

## 2024-09-13 VITALS
WEIGHT: 162 LBS | DIASTOLIC BLOOD PRESSURE: 92 MMHG | RESPIRATION RATE: 16 BRPM | BODY MASS INDEX: 26.03 KG/M2 | HEIGHT: 66 IN | OXYGEN SATURATION: 97 % | HEART RATE: 86 BPM | SYSTOLIC BLOOD PRESSURE: 146 MMHG | TEMPERATURE: 98.4 F

## 2024-09-13 DIAGNOSIS — N13.39 OTHER HYDRONEPHROSIS: Primary | ICD-10-CM

## 2024-09-13 LAB
ALBUMIN SERPL-MCNC: 3.7 G/DL (ref 3.5–5)
ALBUMIN/GLOB SERPL: 0.8 (ref 1.1–2.2)
ALP SERPL-CCNC: 103 U/L (ref 45–117)
ALT SERPL-CCNC: 36 U/L (ref 12–78)
ANION GAP SERPL CALC-SCNC: 5 MMOL/L (ref 2–12)
APPEARANCE UR: CLEAR
AST SERPL-CCNC: 23 U/L (ref 15–37)
BACTERIA URNS QL MICRO: NEGATIVE /HPF
BASOPHILS # BLD: 0.1 K/UL (ref 0–0.1)
BASOPHILS NFR BLD: 1 % (ref 0–1)
BILIRUB SERPL-MCNC: 0.4 MG/DL (ref 0.2–1)
BILIRUB UR QL: NEGATIVE
BUN SERPL-MCNC: 15 MG/DL (ref 6–20)
BUN/CREAT SERPL: 11 (ref 12–20)
CALCIUM SERPL-MCNC: 9.9 MG/DL (ref 8.5–10.1)
CHLORIDE SERPL-SCNC: 104 MMOL/L (ref 97–108)
CO2 SERPL-SCNC: 29 MMOL/L (ref 21–32)
COLOR UR: NORMAL
CREAT SERPL-MCNC: 1.36 MG/DL (ref 0.7–1.3)
DIFFERENTIAL METHOD BLD: ABNORMAL
EOSINOPHIL # BLD: 0.2 K/UL (ref 0–0.4)
EOSINOPHIL NFR BLD: 2 % (ref 0–7)
EPITH CASTS URNS QL MICRO: NORMAL /LPF
ERYTHROCYTE [DISTWIDTH] IN BLOOD BY AUTOMATED COUNT: 12.6 % (ref 11.5–14.5)
GLOBULIN SER CALC-MCNC: 4.4 G/DL (ref 2–4)
GLUCOSE SERPL-MCNC: 100 MG/DL (ref 65–100)
GLUCOSE UR STRIP.AUTO-MCNC: NEGATIVE MG/DL
HCT VFR BLD AUTO: 48 % (ref 36.6–50.3)
HGB BLD-MCNC: 16.4 G/DL (ref 12.1–17)
HGB UR QL STRIP: NEGATIVE
HYALINE CASTS URNS QL MICRO: NORMAL /LPF (ref 0–2)
IMM GRANULOCYTES # BLD AUTO: 0 K/UL (ref 0–0.04)
IMM GRANULOCYTES NFR BLD AUTO: 0 % (ref 0–0.5)
KETONES UR QL STRIP.AUTO: NEGATIVE MG/DL
LEUKOCYTE ESTERASE UR QL STRIP.AUTO: NEGATIVE
LYMPHOCYTES # BLD: 3.1 K/UL (ref 0.8–3.5)
LYMPHOCYTES NFR BLD: 32 % (ref 12–49)
MAGNESIUM SERPL-MCNC: 2.4 MG/DL (ref 1.6–2.4)
MCH RBC QN AUTO: 33 PG (ref 26–34)
MCHC RBC AUTO-ENTMCNC: 34.2 G/DL (ref 30–36.5)
MCV RBC AUTO: 96.6 FL (ref 80–99)
MONOCYTES # BLD: 1 K/UL (ref 0–1)
MONOCYTES NFR BLD: 10 % (ref 5–13)
NEUTS SEG # BLD: 5.5 K/UL (ref 1.8–8)
NEUTS SEG NFR BLD: 55 % (ref 32–75)
NITRITE UR QL STRIP.AUTO: NEGATIVE
NRBC # BLD: 0 K/UL (ref 0–0.01)
NRBC BLD-RTO: 0 PER 100 WBC
PH UR STRIP: 7 (ref 5–8)
PLATELET # BLD AUTO: 409 K/UL (ref 150–400)
PMV BLD AUTO: 8.8 FL (ref 8.9–12.9)
POTASSIUM SERPL-SCNC: 4.1 MMOL/L (ref 3.5–5.1)
PROT SERPL-MCNC: 8.1 G/DL (ref 6.4–8.2)
PROT UR STRIP-MCNC: NEGATIVE MG/DL
RBC # BLD AUTO: 4.97 M/UL (ref 4.1–5.7)
RBC #/AREA URNS HPF: NORMAL /HPF (ref 0–5)
SODIUM SERPL-SCNC: 138 MMOL/L (ref 136–145)
SP GR UR REFRACTOMETRY: 1.01 (ref 1–1.03)
URINE CULTURE IF INDICATED: NORMAL
UROBILINOGEN UR QL STRIP.AUTO: 0.2 EU/DL (ref 0.2–1)
WBC # BLD AUTO: 9.9 K/UL (ref 4.1–11.1)
WBC URNS QL MICRO: NORMAL /HPF (ref 0–4)

## 2024-09-13 PROCEDURE — 85025 COMPLETE CBC W/AUTO DIFF WBC: CPT

## 2024-09-13 PROCEDURE — 83735 ASSAY OF MAGNESIUM: CPT

## 2024-09-13 PROCEDURE — 80053 COMPREHEN METABOLIC PANEL: CPT

## 2024-09-13 PROCEDURE — 74176 CT ABD & PELVIS W/O CONTRAST: CPT

## 2024-09-13 PROCEDURE — 81001 URINALYSIS AUTO W/SCOPE: CPT

## 2024-09-13 PROCEDURE — 36415 COLL VENOUS BLD VENIPUNCTURE: CPT

## 2024-09-13 PROCEDURE — 99284 EMERGENCY DEPT VISIT MOD MDM: CPT

## 2024-09-13 ASSESSMENT — PAIN - FUNCTIONAL ASSESSMENT: PAIN_FUNCTIONAL_ASSESSMENT: NONE - DENIES PAIN

## 2024-12-14 ENCOUNTER — APPOINTMENT (OUTPATIENT)
Facility: HOSPITAL | Age: 59
End: 2024-12-14
Payer: COMMERCIAL

## 2024-12-14 ENCOUNTER — HOSPITAL ENCOUNTER (EMERGENCY)
Facility: HOSPITAL | Age: 59
Discharge: HOME | End: 2024-12-14
Attending: EMERGENCY MEDICINE
Payer: COMMERCIAL

## 2024-12-14 VITALS
WEIGHT: 170 LBS | TEMPERATURE: 97.9 F | HEART RATE: 88 BPM | BODY MASS INDEX: 27.32 KG/M2 | DIASTOLIC BLOOD PRESSURE: 87 MMHG | RESPIRATION RATE: 20 BRPM | OXYGEN SATURATION: 92 % | HEIGHT: 66 IN | SYSTOLIC BLOOD PRESSURE: 128 MMHG

## 2024-12-14 DIAGNOSIS — N20.0 KIDNEY STONE: Primary | ICD-10-CM

## 2024-12-14 DIAGNOSIS — R10.9 LEFT FLANK PAIN: ICD-10-CM

## 2024-12-14 DIAGNOSIS — R11.2 NAUSEA AND VOMITING IN ADULT: ICD-10-CM

## 2024-12-14 LAB
ALBUMIN SERPL-MCNC: 4.2 G/DL (ref 3.5–5)
ALBUMIN/GLOB SERPL: 1.2 (ref 1.1–2.2)
ALP SERPL-CCNC: 83 U/L (ref 45–117)
ALT SERPL-CCNC: 39 U/L (ref 12–78)
AMORPH CRY URNS QL MICRO: ABNORMAL
ANION GAP SERPL CALC-SCNC: 5 MMOL/L (ref 2–12)
APPEARANCE UR: ABNORMAL
AST SERPL-CCNC: 24 U/L (ref 15–37)
BACTERIA URNS QL MICRO: ABNORMAL /HPF
BASOPHILS # BLD: 0 K/UL (ref 0–0.1)
BASOPHILS NFR BLD: 1 % (ref 0–1)
BILIRUB SERPL-MCNC: 0.8 MG/DL (ref 0.2–1)
BILIRUB UR QL CFM: NEGATIVE
BUN SERPL-MCNC: 21 MG/DL (ref 6–20)
BUN/CREAT SERPL: 13 (ref 12–20)
CALCIUM SERPL-MCNC: 9.5 MG/DL (ref 8.5–10.1)
CHLORIDE SERPL-SCNC: 106 MMOL/L (ref 97–108)
CO2 SERPL-SCNC: 27 MMOL/L (ref 21–32)
COLOR UR: ABNORMAL
COMMENT:: NORMAL
CREAT SERPL-MCNC: 1.68 MG/DL (ref 0.7–1.3)
DIFFERENTIAL METHOD BLD: ABNORMAL
EOSINOPHIL # BLD: 0.1 K/UL (ref 0–0.4)
EOSINOPHIL NFR BLD: 1 % (ref 0–7)
EPITH CASTS URNS QL MICRO: ABNORMAL /LPF
ERYTHROCYTE [DISTWIDTH] IN BLOOD BY AUTOMATED COUNT: 12.7 % (ref 11.5–14.5)
GLOBULIN SER CALC-MCNC: 3.6 G/DL (ref 2–4)
GLUCOSE SERPL-MCNC: 140 MG/DL (ref 65–100)
GLUCOSE UR STRIP.AUTO-MCNC: NEGATIVE MG/DL
HCT VFR BLD AUTO: 45.4 % (ref 36.6–50.3)
HGB BLD-MCNC: 15.5 G/DL (ref 12.1–17)
HGB UR QL STRIP: ABNORMAL
IMM GRANULOCYTES # BLD AUTO: 0 K/UL (ref 0–0.04)
IMM GRANULOCYTES NFR BLD AUTO: 1 % (ref 0–0.5)
KETONES UR QL STRIP.AUTO: NEGATIVE MG/DL
LEUKOCYTE ESTERASE UR QL STRIP.AUTO: ABNORMAL
LIPASE SERPL-CCNC: 93 U/L (ref 13–75)
LYMPHOCYTES # BLD: 1.8 K/UL (ref 0.8–3.5)
LYMPHOCYTES NFR BLD: 43 % (ref 12–49)
MCH RBC QN AUTO: 32.6 PG (ref 26–34)
MCHC RBC AUTO-ENTMCNC: 34.1 G/DL (ref 30–36.5)
MCV RBC AUTO: 95.6 FL (ref 80–99)
MONOCYTES # BLD: 0.5 K/UL (ref 0–1)
MONOCYTES NFR BLD: 13 % (ref 5–13)
MUCOUS THREADS URNS QL MICRO: ABNORMAL /LPF
NEUTS SEG # BLD: 1.7 K/UL (ref 1.8–8)
NEUTS SEG NFR BLD: 41 % (ref 32–75)
NITRITE UR QL STRIP.AUTO: NEGATIVE
NRBC # BLD: 0 K/UL (ref 0–0.01)
NRBC BLD-RTO: 0 PER 100 WBC
PH UR STRIP: 5 (ref 5–8)
PLATELET # BLD AUTO: 301 K/UL (ref 150–400)
PMV BLD AUTO: 9.2 FL (ref 8.9–12.9)
POTASSIUM SERPL-SCNC: 3.8 MMOL/L (ref 3.5–5.1)
PROT SERPL-MCNC: 7.8 G/DL (ref 6.4–8.2)
PROT UR STRIP-MCNC: 30 MG/DL
RBC # BLD AUTO: 4.75 M/UL (ref 4.1–5.7)
RBC #/AREA URNS HPF: >100 /HPF (ref 0–5)
SODIUM SERPL-SCNC: 138 MMOL/L (ref 136–145)
SP GR UR REFRACTOMETRY: 1.02 (ref 1–1.03)
SPECIMEN HOLD: NORMAL
URINE CULTURE IF INDICATED: ABNORMAL
UROBILINOGEN UR QL STRIP.AUTO: 0.2 EU/DL (ref 0.2–1)
WBC # BLD AUTO: 4.1 K/UL (ref 4.1–11.1)
WBC URNS QL MICRO: ABNORMAL /HPF (ref 0–4)

## 2024-12-14 PROCEDURE — 99284 EMERGENCY DEPT VISIT MOD MDM: CPT

## 2024-12-14 PROCEDURE — 96376 TX/PRO/DX INJ SAME DRUG ADON: CPT

## 2024-12-14 PROCEDURE — 96375 TX/PRO/DX INJ NEW DRUG ADDON: CPT

## 2024-12-14 PROCEDURE — 96374 THER/PROPH/DIAG INJ IV PUSH: CPT

## 2024-12-14 PROCEDURE — 6360000002 HC RX W HCPCS: Performed by: EMERGENCY MEDICINE

## 2024-12-14 PROCEDURE — 6370000000 HC RX 637 (ALT 250 FOR IP): Performed by: EMERGENCY MEDICINE

## 2024-12-14 PROCEDURE — 81001 URINALYSIS AUTO W/SCOPE: CPT

## 2024-12-14 PROCEDURE — 2580000003 HC RX 258: Performed by: EMERGENCY MEDICINE

## 2024-12-14 PROCEDURE — 80053 COMPREHEN METABOLIC PANEL: CPT

## 2024-12-14 PROCEDURE — 96361 HYDRATE IV INFUSION ADD-ON: CPT

## 2024-12-14 PROCEDURE — 85025 COMPLETE CBC W/AUTO DIFF WBC: CPT

## 2024-12-14 PROCEDURE — 74176 CT ABD & PELVIS W/O CONTRAST: CPT

## 2024-12-14 PROCEDURE — 36415 COLL VENOUS BLD VENIPUNCTURE: CPT

## 2024-12-14 PROCEDURE — 83690 ASSAY OF LIPASE: CPT

## 2024-12-14 RX ORDER — HYDROMORPHONE HYDROCHLORIDE 1 MG/ML
0.5 INJECTION, SOLUTION INTRAMUSCULAR; INTRAVENOUS; SUBCUTANEOUS
Status: COMPLETED | OUTPATIENT
Start: 2024-12-14 | End: 2024-12-14

## 2024-12-14 RX ORDER — KETOROLAC TROMETHAMINE 15 MG/ML
15 INJECTION, SOLUTION INTRAMUSCULAR; INTRAVENOUS
Status: COMPLETED | OUTPATIENT
Start: 2024-12-14 | End: 2024-12-14

## 2024-12-14 RX ORDER — 0.9 % SODIUM CHLORIDE 0.9 %
1000 INTRAVENOUS SOLUTION INTRAVENOUS ONCE
Status: COMPLETED | OUTPATIENT
Start: 2024-12-14 | End: 2024-12-14

## 2024-12-14 RX ORDER — OXYCODONE AND ACETAMINOPHEN 5; 325 MG/1; MG/1
1 TABLET ORAL
Status: COMPLETED | OUTPATIENT
Start: 2024-12-14 | End: 2024-12-14

## 2024-12-14 RX ORDER — ONDANSETRON 4 MG/1
4 TABLET, ORALLY DISINTEGRATING ORAL 3 TIMES DAILY PRN
Qty: 21 TABLET | Refills: 0 | Status: ON HOLD | OUTPATIENT
Start: 2024-12-14 | End: 2024-12-17 | Stop reason: HOSPADM

## 2024-12-14 RX ORDER — OXYCODONE AND ACETAMINOPHEN 5; 325 MG/1; MG/1
1 TABLET ORAL
Status: DISCONTINUED | OUTPATIENT
Start: 2024-12-14 | End: 2024-12-14

## 2024-12-14 RX ORDER — OXYCODONE AND ACETAMINOPHEN 5; 325 MG/1; MG/1
1 TABLET ORAL EVERY 6 HOURS PRN
Qty: 12 TABLET | Refills: 0 | Status: ON HOLD | OUTPATIENT
Start: 2024-12-14 | End: 2024-12-17 | Stop reason: HOSPADM

## 2024-12-14 RX ORDER — ONDANSETRON 2 MG/ML
4 INJECTION INTRAMUSCULAR; INTRAVENOUS
Status: COMPLETED | OUTPATIENT
Start: 2024-12-14 | End: 2024-12-14

## 2024-12-14 RX ADMIN — ONDANSETRON 4 MG: 2 INJECTION, SOLUTION INTRAMUSCULAR; INTRAVENOUS at 12:00

## 2024-12-14 RX ADMIN — HYDROMORPHONE HYDROCHLORIDE 0.5 MG: 1 INJECTION, SOLUTION INTRAMUSCULAR; INTRAVENOUS; SUBCUTANEOUS at 12:36

## 2024-12-14 RX ADMIN — KETOROLAC TROMETHAMINE 15 MG: 15 INJECTION, SOLUTION INTRAMUSCULAR; INTRAVENOUS at 12:00

## 2024-12-14 RX ADMIN — HYDROMORPHONE HYDROCHLORIDE 0.5 MG: 1 INJECTION, SOLUTION INTRAMUSCULAR; INTRAVENOUS; SUBCUTANEOUS at 14:34

## 2024-12-14 RX ADMIN — OXYCODONE HYDROCHLORIDE AND ACETAMINOPHEN 1 TABLET: 5; 325 TABLET ORAL at 14:51

## 2024-12-14 RX ADMIN — SODIUM CHLORIDE 1000 ML: 9 INJECTION, SOLUTION INTRAVENOUS at 11:59

## 2024-12-14 ASSESSMENT — PAIN DESCRIPTION - ORIENTATION: ORIENTATION: LEFT

## 2024-12-14 ASSESSMENT — PAIN DESCRIPTION - DESCRIPTORS: DESCRIPTORS: ACHING

## 2024-12-14 ASSESSMENT — PAIN - FUNCTIONAL ASSESSMENT
PAIN_FUNCTIONAL_ASSESSMENT: 0-10
PAIN_FUNCTIONAL_ASSESSMENT: ACTIVITIES ARE NOT PREVENTED
PAIN_FUNCTIONAL_ASSESSMENT: 0-10

## 2024-12-14 ASSESSMENT — PAIN DESCRIPTION - PAIN TYPE: TYPE: ACUTE PAIN

## 2024-12-14 ASSESSMENT — PAIN DESCRIPTION - LOCATION: LOCATION: FLANK;GROIN;ABDOMEN

## 2024-12-14 ASSESSMENT — PAIN SCALES - GENERAL
PAINLEVEL_OUTOF10: 6
PAINLEVEL_OUTOF10: 3

## 2024-12-14 NOTE — ED PROVIDER NOTES
[facility] EMERGENCY DEPT  EMERGENCY DEPARTMENT ENCOUNTER      Pt Name: Bharat Betancur  MRN: [id number]  Birthdate 1965  Date of evaluation: 12/14/2024  Provider: Sai Fuentes DO      HISTORY OF PRESENT ILLNESS      HPI        Nursing Notes were reviewed.    REVIEW OF SYSTEMS         Review of Systems        PAST MEDICAL HISTORY     Past Medical History:   Diagnosis Date    Essential hypertension 2018    Hearing reduced 06/15/2022         SURGICAL HISTORY     No past surgical history on file.      CURRENT MEDICATIONS       Previous Medications    ADALIMUMAB (HUMIRA PEN) 40 MG/0.4ML PNKT    ceived the following from [facility]: Outside name: Humira,CF, Pen 40 mg/0.4 mL injection pen    AMLODIPINE (NORVASC) 5 MG TABLET    TAKE ONE AND 1/2 TABLETS BY MOUTH ONCE DAILY 90    DIAZEPAM (VALIUM) 10 MG TABLET    1 po 45minutes prior to MRI--Needs     DICLOFENAC (VOLTAREN) 75 MG EC TABLET    Take 75 mg by mouth 2 times daily    MECLIZINE (ANTIVERT) 25 MG TABLET    1 TABLET AS NEEDED ORALLY 1-3X PER DAY 10 DAYS    TRAZODONE (DESYREL) 100 MG TABLET    Take 100 mg by mouth       ALLERGIES     Cherry    FAMILY HISTORY       Family History   Problem Relation Age of Onset    Heart Disease Mother           SOCIAL HISTORY       Social History     Socioeconomic History    Marital status:    Tobacco Use    Smoking status: Never    Smokeless tobacco: Never   Substance and Sexual Activity    Alcohol use: Yes     Alcohol/week: 1.0 standard drink of alcohol    Drug use: Never         PHYSICAL EXAM       ED Triage Vitals [12/14/24 1128]   BP Systolic BP Percentile Diastolic BP Percentile Temp Temp Source Pulse Respirations SpO2   (!) 128/93 -- -- 97.9 °F (36.6 °C) Oral (!) 102 18 94 %      Height Weight - Scale         1.676 m (5' 6\") 77.1 kg (170 lb)             Body mass index is 27.44 kg/m².    Physical Exam        EMERGENCY DEPARTMENT COURSE and DIFFERENTIAL DIAGNOSIS/MDM:   Vitals:    Vitals:

## 2024-12-14 NOTE — ED TRIAGE NOTES
Pt arrives ambulatory for triage reporting that at 0830 he developed left flank, left lateral abdominal pain, that radiates back and forth to the left groin.  Pt has a history of kidney stones, usually on the right side.  No N/V, no dysuria at this time.      Pt had orthoscopic knee surgery 12/4.  Hx of hypertension.

## 2024-12-15 ENCOUNTER — APPOINTMENT (OUTPATIENT)
Facility: HOSPITAL | Age: 59
DRG: 694 | End: 2024-12-15
Payer: COMMERCIAL

## 2024-12-15 ENCOUNTER — HOSPITAL ENCOUNTER (INPATIENT)
Facility: HOSPITAL | Age: 59
LOS: 2 days | Discharge: HOME | DRG: 694 | End: 2024-12-17
Attending: STUDENT IN AN ORGANIZED HEALTH CARE EDUCATION/TRAINING PROGRAM | Admitting: HOSPITALIST
Payer: COMMERCIAL

## 2024-12-15 DIAGNOSIS — K59.00 CONSTIPATION, UNSPECIFIED CONSTIPATION TYPE: ICD-10-CM

## 2024-12-15 DIAGNOSIS — N20.1 URETEROLITHIASIS: ICD-10-CM

## 2024-12-15 DIAGNOSIS — N17.9 AKI (ACUTE KIDNEY INJURY) (HCC): Primary | ICD-10-CM

## 2024-12-15 LAB
ALBUMIN SERPL-MCNC: 4.1 G/DL (ref 3.5–5)
ALBUMIN/GLOB SERPL: 1.2 (ref 1.1–2.2)
ALP SERPL-CCNC: 75 U/L (ref 45–117)
ALT SERPL-CCNC: 37 U/L (ref 12–78)
ANION GAP SERPL CALC-SCNC: 7 MMOL/L (ref 2–12)
APPEARANCE UR: CLEAR
AST SERPL-CCNC: 24 U/L (ref 15–37)
BACTERIA URNS QL MICRO: NEGATIVE /HPF
BASOPHILS # BLD: 0.1 K/UL (ref 0–0.1)
BASOPHILS NFR BLD: 1 % (ref 0–1)
BILIRUB SERPL-MCNC: 0.8 MG/DL (ref 0.2–1)
BILIRUB UR QL: NEGATIVE
BUN SERPL-MCNC: 21 MG/DL (ref 6–20)
BUN/CREAT SERPL: 9 (ref 12–20)
CALCIUM SERPL-MCNC: 9.6 MG/DL (ref 8.5–10.1)
CHLORIDE SERPL-SCNC: 106 MMOL/L (ref 97–108)
CO2 SERPL-SCNC: 27 MMOL/L (ref 21–32)
COLOR UR: NORMAL
CREAT SERPL-MCNC: 2.35 MG/DL (ref 0.7–1.3)
DIFFERENTIAL METHOD BLD: NORMAL
EOSINOPHIL # BLD: 0.1 K/UL (ref 0–0.4)
EOSINOPHIL NFR BLD: 1 % (ref 0–7)
EPITH CASTS URNS QL MICRO: NORMAL /LPF
ERYTHROCYTE [DISTWIDTH] IN BLOOD BY AUTOMATED COUNT: 12.8 % (ref 11.5–14.5)
GLOBULIN SER CALC-MCNC: 3.5 G/DL (ref 2–4)
GLUCOSE SERPL-MCNC: 129 MG/DL (ref 65–100)
GLUCOSE UR STRIP.AUTO-MCNC: NEGATIVE MG/DL
HCT VFR BLD AUTO: 45.4 % (ref 36.6–50.3)
HGB BLD-MCNC: 15.8 G/DL (ref 12.1–17)
HGB UR QL STRIP: NEGATIVE
HYALINE CASTS URNS QL MICRO: NORMAL /LPF (ref 0–2)
IMM GRANULOCYTES # BLD AUTO: 0 K/UL (ref 0–0.04)
IMM GRANULOCYTES NFR BLD AUTO: 0 % (ref 0–0.5)
KETONES UR QL STRIP.AUTO: NEGATIVE MG/DL
LEUKOCYTE ESTERASE UR QL STRIP.AUTO: NEGATIVE
LYMPHOCYTES # BLD: 1.9 K/UL (ref 0.8–3.5)
LYMPHOCYTES NFR BLD: 21 % (ref 12–49)
MCH RBC QN AUTO: 33.5 PG (ref 26–34)
MCHC RBC AUTO-ENTMCNC: 34.8 G/DL (ref 30–36.5)
MCV RBC AUTO: 96.2 FL (ref 80–99)
MONOCYTES # BLD: 0.8 K/UL (ref 0–1)
MONOCYTES NFR BLD: 9 % (ref 5–13)
NEUTS SEG # BLD: 6.1 K/UL (ref 1.8–8)
NEUTS SEG NFR BLD: 68 % (ref 32–75)
NITRITE UR QL STRIP.AUTO: NEGATIVE
NRBC # BLD: 0 K/UL (ref 0–0.01)
NRBC BLD-RTO: 0 PER 100 WBC
PH UR STRIP: 5 (ref 5–8)
PLATELET # BLD AUTO: 302 K/UL (ref 150–400)
PMV BLD AUTO: 9 FL (ref 8.9–12.9)
POTASSIUM SERPL-SCNC: 4.1 MMOL/L (ref 3.5–5.1)
PROT SERPL-MCNC: 7.6 G/DL (ref 6.4–8.2)
PROT UR STRIP-MCNC: NEGATIVE MG/DL
RBC # BLD AUTO: 4.72 M/UL (ref 4.1–5.7)
RBC #/AREA URNS HPF: NORMAL /HPF (ref 0–5)
SODIUM SERPL-SCNC: 140 MMOL/L (ref 136–145)
SP GR UR REFRACTOMETRY: 1.01 (ref 1–1.03)
URINE CULTURE IF INDICATED: NORMAL
UROBILINOGEN UR QL STRIP.AUTO: 0.2 EU/DL (ref 0.2–1)
WBC # BLD AUTO: 9 K/UL (ref 4.1–11.1)
WBC URNS QL MICRO: NORMAL /HPF (ref 0–4)

## 2024-12-15 PROCEDURE — 96361 HYDRATE IV INFUSION ADD-ON: CPT

## 2024-12-15 PROCEDURE — 80053 COMPREHEN METABOLIC PANEL: CPT

## 2024-12-15 PROCEDURE — 85025 COMPLETE CBC W/AUTO DIFF WBC: CPT

## 2024-12-15 PROCEDURE — 6370000000 HC RX 637 (ALT 250 FOR IP): Performed by: HOSPITALIST

## 2024-12-15 PROCEDURE — 81001 URINALYSIS AUTO W/SCOPE: CPT

## 2024-12-15 PROCEDURE — 96360 HYDRATION IV INFUSION INIT: CPT

## 2024-12-15 PROCEDURE — 74018 RADEX ABDOMEN 1 VIEW: CPT

## 2024-12-15 PROCEDURE — 2580000003 HC RX 258: Performed by: STUDENT IN AN ORGANIZED HEALTH CARE EDUCATION/TRAINING PROGRAM

## 2024-12-15 PROCEDURE — 1100000000 HC RM PRIVATE

## 2024-12-15 PROCEDURE — 74176 CT ABD & PELVIS W/O CONTRAST: CPT

## 2024-12-15 PROCEDURE — 6360000002 HC RX W HCPCS: Performed by: HOSPITALIST

## 2024-12-15 PROCEDURE — 99285 EMERGENCY DEPT VISIT HI MDM: CPT

## 2024-12-15 PROCEDURE — 36415 COLL VENOUS BLD VENIPUNCTURE: CPT

## 2024-12-15 PROCEDURE — 2580000003 HC RX 258: Performed by: HOSPITALIST

## 2024-12-15 RX ORDER — MECLIZINE HYDROCHLORIDE 25 MG/1
25 TABLET ORAL 3 TIMES DAILY PRN
Status: DISCONTINUED | OUTPATIENT
Start: 2024-12-15 | End: 2024-12-17 | Stop reason: HOSPADM

## 2024-12-15 RX ORDER — ACETAMINOPHEN 650 MG/1
650 SUPPOSITORY RECTAL EVERY 6 HOURS PRN
Status: DISCONTINUED | OUTPATIENT
Start: 2024-12-15 | End: 2024-12-17 | Stop reason: HOSPADM

## 2024-12-15 RX ORDER — POTASSIUM CHLORIDE 7.45 MG/ML
10 INJECTION INTRAVENOUS PRN
Status: DISCONTINUED | OUTPATIENT
Start: 2024-12-15 | End: 2024-12-17 | Stop reason: HOSPADM

## 2024-12-15 RX ORDER — MAGNESIUM SULFATE IN WATER 40 MG/ML
2000 INJECTION, SOLUTION INTRAVENOUS PRN
Status: DISCONTINUED | OUTPATIENT
Start: 2024-12-15 | End: 2024-12-17 | Stop reason: HOSPADM

## 2024-12-15 RX ORDER — ACETAMINOPHEN 325 MG/1
650 TABLET ORAL EVERY 6 HOURS PRN
Status: DISCONTINUED | OUTPATIENT
Start: 2024-12-15 | End: 2024-12-17 | Stop reason: HOSPADM

## 2024-12-15 RX ORDER — AMLODIPINE BESYLATE 5 MG/1
5 TABLET ORAL DAILY
Status: DISCONTINUED | OUTPATIENT
Start: 2024-12-15 | End: 2024-12-15

## 2024-12-15 RX ORDER — POLYETHYLENE GLYCOL 3350 17 G/17G
17 POWDER, FOR SOLUTION ORAL DAILY PRN
Status: DISCONTINUED | OUTPATIENT
Start: 2024-12-15 | End: 2024-12-17 | Stop reason: HOSPADM

## 2024-12-15 RX ORDER — ONDANSETRON 2 MG/ML
4 INJECTION INTRAMUSCULAR; INTRAVENOUS EVERY 6 HOURS PRN
Status: DISCONTINUED | OUTPATIENT
Start: 2024-12-15 | End: 2024-12-17 | Stop reason: HOSPADM

## 2024-12-15 RX ORDER — TAMSULOSIN HYDROCHLORIDE 0.4 MG/1
0.4 CAPSULE ORAL DAILY
Status: DISCONTINUED | OUTPATIENT
Start: 2024-12-15 | End: 2024-12-17 | Stop reason: HOSPADM

## 2024-12-15 RX ORDER — SODIUM CHLORIDE 0.9 % (FLUSH) 0.9 %
5-40 SYRINGE (ML) INJECTION PRN
Status: DISCONTINUED | OUTPATIENT
Start: 2024-12-15 | End: 2024-12-17 | Stop reason: HOSPADM

## 2024-12-15 RX ORDER — POTASSIUM CHLORIDE 750 MG/1
40 TABLET, EXTENDED RELEASE ORAL PRN
Status: DISCONTINUED | OUTPATIENT
Start: 2024-12-15 | End: 2024-12-17 | Stop reason: HOSPADM

## 2024-12-15 RX ORDER — SODIUM CHLORIDE 9 MG/ML
INJECTION, SOLUTION INTRAVENOUS PRN
Status: DISCONTINUED | OUTPATIENT
Start: 2024-12-15 | End: 2024-12-17 | Stop reason: HOSPADM

## 2024-12-15 RX ORDER — OXYCODONE AND ACETAMINOPHEN 5; 325 MG/1; MG/1
1 TABLET ORAL EVERY 6 HOURS PRN
Status: DISCONTINUED | OUTPATIENT
Start: 2024-12-15 | End: 2024-12-17 | Stop reason: HOSPADM

## 2024-12-15 RX ORDER — 0.9 % SODIUM CHLORIDE 0.9 %
1000 INTRAVENOUS SOLUTION INTRAVENOUS ONCE
Status: COMPLETED | OUTPATIENT
Start: 2024-12-15 | End: 2024-12-15

## 2024-12-15 RX ORDER — SODIUM CHLORIDE 9 MG/ML
INJECTION, SOLUTION INTRAVENOUS CONTINUOUS
Status: DISCONTINUED | OUTPATIENT
Start: 2024-12-15 | End: 2024-12-17 | Stop reason: HOSPADM

## 2024-12-15 RX ORDER — ONDANSETRON 4 MG/1
4 TABLET, ORALLY DISINTEGRATING ORAL EVERY 8 HOURS PRN
Status: DISCONTINUED | OUTPATIENT
Start: 2024-12-15 | End: 2024-12-17 | Stop reason: HOSPADM

## 2024-12-15 RX ORDER — TRAZODONE HYDROCHLORIDE 100 MG/1
100 TABLET ORAL NIGHTLY
Status: DISCONTINUED | OUTPATIENT
Start: 2024-12-15 | End: 2024-12-17 | Stop reason: HOSPADM

## 2024-12-15 RX ORDER — SODIUM CHLORIDE 0.9 % (FLUSH) 0.9 %
5-40 SYRINGE (ML) INJECTION EVERY 12 HOURS SCHEDULED
Status: DISCONTINUED | OUTPATIENT
Start: 2024-12-15 | End: 2024-12-17 | Stop reason: HOSPADM

## 2024-12-15 RX ADMIN — TAMSULOSIN HYDROCHLORIDE 0.4 MG: 0.4 CAPSULE ORAL at 20:31

## 2024-12-15 RX ADMIN — SODIUM CHLORIDE 1000 ML: 9 INJECTION, SOLUTION INTRAVENOUS at 16:31

## 2024-12-15 RX ADMIN — SODIUM CHLORIDE: 9 INJECTION, SOLUTION INTRAVENOUS at 20:32

## 2024-12-15 RX ADMIN — SODIUM CHLORIDE 40 MG: 9 INJECTION INTRAMUSCULAR; INTRAVENOUS; SUBCUTANEOUS at 20:31

## 2024-12-15 RX ADMIN — TRAZODONE HYDROCHLORIDE 100 MG: 100 TABLET ORAL at 20:31

## 2024-12-15 RX ADMIN — WATER 1000 MG: 1 INJECTION INTRAMUSCULAR; INTRAVENOUS; SUBCUTANEOUS at 20:30

## 2024-12-15 RX ADMIN — POLYETHYLENE GLYCOL 3350 17 G: 17 POWDER, FOR SOLUTION ORAL at 20:54

## 2024-12-15 ASSESSMENT — PAIN DESCRIPTION - ORIENTATION: ORIENTATION: MID

## 2024-12-15 ASSESSMENT — PAIN DESCRIPTION - DESCRIPTORS: DESCRIPTORS: ACHING

## 2024-12-15 ASSESSMENT — PAIN SCALES - GENERAL
PAINLEVEL_OUTOF10: 0
PAINLEVEL_OUTOF10: 4
PAINLEVEL_OUTOF10: 4

## 2024-12-15 ASSESSMENT — PAIN - FUNCTIONAL ASSESSMENT: PAIN_FUNCTIONAL_ASSESSMENT: 0-10

## 2024-12-15 ASSESSMENT — PAIN DESCRIPTION - LOCATION
LOCATION: ABDOMEN
LOCATION: ABDOMEN

## 2024-12-15 NOTE — ED TRIAGE NOTES
Patient arrives with c/o mid-abdominal/pelvic pain since this morning.     States seen at [facility] ED yesterday for left flank pain and diagnosed with kidney stones.     States has not had urge to urinate.    Denies N/V, fever.    Reports hx of kidney stone.     States last BM: two days ago.

## 2024-12-15 NOTE — H&P
Hospitalist Admission Note      NAME:  Bharat Betancur   :  1965   MRN:  [id number]     Date/Time:  12/15/2024 6:55 PM    Patient PCP: Gordy Chan PA    ________________________________________________________________________    Given the patient's current clinical presentation, I have a high level of concern for decompensation if discharged from the emergency department.  Complex decision making was performed, which includes reviewing the patient's available past medical records, laboratory results, and x-ray films.       My assessment of this patient's clinical condition and my plan of care is as follows.    Assessment / Plan:  Patient is a 59-year-old male with a history of CKD stage III, rheumatoid arthritis, hypertension, GERD, kidney stones comes to the hospital with left hydroureteronephrosis, 2 mm distal ureteral stone, BRICE.  Patient admitted for IV fluids, pain control and urology evaluation.    1.  Acute on  CKD stage III  IV fluids, avoid nephrotoxic medications.  Holding olmesartan.  Due to ureteral stone and hydroureteronephrosis.  Patient has CKD stage III as a baseline and he follows nephrology.     2.  Left ureteral stone with left hydroureteronephrosis  Patient has a 2 mm calculus at the distal left ureter.  Pain control, IV fluids, urology consult.  N.p.o. after midnight  Start IV Rocephin.    3.  Rheumatoid arthritis  Patient was taking Humira in the past.  Patient is on prednisone at this time.    4.  GERD  Patient on Protonix at home.    5.  Hypertension  Patient is taking olmesartan now.       I have personally reviewed the radiographs, laboratory data in Epic and decisions and statements above are based partially on this personal interpretation.    Code Status: Full Code  DVT Prophylaxis: SCD's  GI Prophylaxis: PPI    Plan of care discussed with the patient at bedside.     Subjective:   CHIEF COMPLAINT: \"Abdominal pain\"    HISTORY OF PRESENT ILLNESS:     Bharat is a 59 y.o.

## 2024-12-15 NOTE — ED PROVIDER NOTES
DIFFERENTIAL DIAGNOSIS/MDM:   Vitals:    Vitals:    12/15/24 1452   BP: (!) 152/88   Pulse: (!) 104   Resp: 18   Temp: 98 °F (36.7 °C)   SpO2: 96%   Weight: 77.1 kg (170 lb)   Height: 1.676 m (5' 6\")           Medical Decision Making      DECISION MAKING:  Bharat Betancur is a 59 y.o. male who comes in as above.  Vital signs reviewed, patient is afebrile.  Blood pressure 152/88 and heart rate 104 beats minute.  On my examination, he is uncomfortable appearing, in mild distress.  Abdomen is soft but there is suprapubic and left lower quadrant tenderness.  No CVA tenderness.  No guarding or rebound.  Differential diagnosis includes, not limited to, cystitis, diverticulitis, constipation, kidney stone.    Labs reviewed.  CBC without leukocytosis or anemia.  Electrolytes are stable.  BUN is 21 and creatinine is up to 2.35, baseline is approximately 1.3.  LFTs within normal limits.  UA negative for infection or blood.  KUB shows large stool burden in the right colon.    Given his worsening kidney dysfunction will evaluate CT of the abdomen/pelvis to assess for passage of kidney stone.    CT of the abdomen pelvis shows redemonstration of left-sided hydroureteronephrosis with a 2 mm calculi at the distal left ureter with interval increase in adjacent inflammatory changes and new adjacent fluid concerning for inflammatory changes secondary to obstructive uropathy versus infectious process.  Given negative urinalysis with no white count or fever and no CVA tenderness not pyelonephritis.  Will admit for IV hydration and urology consultation given his worsening kidney function with associated ureteral stone.      Amount and/or Complexity of Data Reviewed  Labs: ordered. Decision-making details documented in ED Course.  Radiology: ordered. Decision-making details documented in ED Course.    Risk  Prescription drug management.  Decision regarding hospitalization.      Total critical care time spent exclusive of procedures:  32

## 2024-12-16 PROBLEM — M06.9 RHEUMATOID ARTHRITIS (HCC): Status: ACTIVE | Noted: 2024-12-16

## 2024-12-16 PROBLEM — K21.9 GERD (GASTROESOPHAGEAL REFLUX DISEASE): Status: ACTIVE | Noted: 2024-12-16

## 2024-12-16 PROBLEM — N13.2 HYDRONEPHROSIS WITH URINARY OBSTRUCTION DUE TO URETERAL CALCULUS: Status: ACTIVE | Noted: 2024-12-16

## 2024-12-16 PROBLEM — N13.30 HYDRONEPHROSIS, LEFT: Status: ACTIVE | Noted: 2024-12-16

## 2024-12-16 PROBLEM — K59.00 CONSTIPATION: Status: ACTIVE | Noted: 2024-12-16

## 2024-12-16 PROBLEM — I10 HTN (HYPERTENSION), BENIGN: Status: ACTIVE | Noted: 2024-12-16

## 2024-12-16 LAB
ANION GAP SERPL CALC-SCNC: 5 MMOL/L (ref 2–12)
BASOPHILS # BLD: 0 K/UL (ref 0–0.1)
BASOPHILS NFR BLD: 0 % (ref 0–1)
BUN SERPL-MCNC: 18 MG/DL (ref 6–20)
BUN/CREAT SERPL: 10 (ref 12–20)
CALCIUM SERPL-MCNC: 8.7 MG/DL (ref 8.5–10.1)
CHLORIDE SERPL-SCNC: 111 MMOL/L (ref 97–108)
CO2 SERPL-SCNC: 25 MMOL/L (ref 21–32)
CREAT SERPL-MCNC: 1.85 MG/DL (ref 0.7–1.3)
DIFFERENTIAL METHOD BLD: NORMAL
EOSINOPHIL # BLD: 0.1 K/UL (ref 0–0.4)
EOSINOPHIL NFR BLD: 1 % (ref 0–7)
ERYTHROCYTE [DISTWIDTH] IN BLOOD BY AUTOMATED COUNT: 13.1 % (ref 11.5–14.5)
GLUCOSE SERPL-MCNC: 100 MG/DL (ref 65–100)
HCT VFR BLD AUTO: 40.1 % (ref 36.6–50.3)
HGB BLD-MCNC: 13.7 G/DL (ref 12.1–17)
IMM GRANULOCYTES # BLD AUTO: 0 K/UL (ref 0–0.04)
IMM GRANULOCYTES NFR BLD AUTO: 0 % (ref 0–0.5)
LYMPHOCYTES # BLD: 2.8 K/UL (ref 0.8–3.5)
LYMPHOCYTES NFR BLD: 25 % (ref 12–49)
MCH RBC QN AUTO: 33.3 PG (ref 26–34)
MCHC RBC AUTO-ENTMCNC: 34.2 G/DL (ref 30–36.5)
MCV RBC AUTO: 97.6 FL (ref 80–99)
MONOCYTES # BLD: 1 K/UL (ref 0–1)
MONOCYTES NFR BLD: 9 % (ref 5–13)
NEUTS SEG # BLD: 7.1 K/UL (ref 1.8–8)
NEUTS SEG NFR BLD: 65 % (ref 32–75)
NRBC # BLD: 0 K/UL (ref 0–0.01)
NRBC BLD-RTO: 0 PER 100 WBC
PLATELET # BLD AUTO: 260 K/UL (ref 150–400)
PMV BLD AUTO: 8.9 FL (ref 8.9–12.9)
POTASSIUM SERPL-SCNC: 3.8 MMOL/L (ref 3.5–5.1)
RBC # BLD AUTO: 4.11 M/UL (ref 4.1–5.7)
SODIUM SERPL-SCNC: 141 MMOL/L (ref 136–145)
WBC # BLD AUTO: 11.1 K/UL (ref 4.1–11.1)

## 2024-12-16 PROCEDURE — 2580000003 HC RX 258: Performed by: HOSPITALIST

## 2024-12-16 PROCEDURE — 36415 COLL VENOUS BLD VENIPUNCTURE: CPT

## 2024-12-16 PROCEDURE — 94761 N-INVAS EAR/PLS OXIMETRY MLT: CPT

## 2024-12-16 PROCEDURE — 1100000000 HC RM PRIVATE

## 2024-12-16 PROCEDURE — 6370000000 HC RX 637 (ALT 250 FOR IP): Performed by: HOSPITALIST

## 2024-12-16 PROCEDURE — 6360000002 HC RX W HCPCS: Performed by: HOSPITALIST

## 2024-12-16 PROCEDURE — 80048 BASIC METABOLIC PNL TOTAL CA: CPT

## 2024-12-16 PROCEDURE — 2580000003 HC RX 258: Performed by: INTERNAL MEDICINE

## 2024-12-16 PROCEDURE — 85025 COMPLETE CBC W/AUTO DIFF WBC: CPT

## 2024-12-16 PROCEDURE — 6370000000 HC RX 637 (ALT 250 FOR IP): Performed by: INTERNAL MEDICINE

## 2024-12-16 PROCEDURE — 87086 URINE CULTURE/COLONY COUNT: CPT

## 2024-12-16 RX ORDER — POLYETHYLENE GLYCOL 3350 17 G/17G
17 POWDER, FOR SOLUTION ORAL 2 TIMES DAILY
Status: DISCONTINUED | OUTPATIENT
Start: 2024-12-16 | End: 2024-12-17 | Stop reason: HOSPADM

## 2024-12-16 RX ORDER — PANTOPRAZOLE SODIUM 40 MG/1
40 TABLET, DELAYED RELEASE ORAL
Status: DISCONTINUED | OUTPATIENT
Start: 2024-12-17 | End: 2024-12-17 | Stop reason: HOSPADM

## 2024-12-16 RX ADMIN — MAGNESIUM HYDROXIDE 30 ML: 400 SUSPENSION ORAL at 09:56

## 2024-12-16 RX ADMIN — POLYETHYLENE GLYCOL 3350 17 G: 17 POWDER, FOR SOLUTION ORAL at 13:11

## 2024-12-16 RX ADMIN — SODIUM CHLORIDE: 9 INJECTION, SOLUTION INTRAVENOUS at 04:42

## 2024-12-16 RX ADMIN — SODIUM CHLORIDE: 9 INJECTION, SOLUTION INTRAVENOUS at 22:34

## 2024-12-16 RX ADMIN — ACETAMINOPHEN 650 MG: 325 TABLET ORAL at 18:21

## 2024-12-16 RX ADMIN — MAGNESIUM HYDROXIDE 30 ML: 400 SUSPENSION ORAL at 20:22

## 2024-12-16 RX ADMIN — WATER 1000 MG: 1 INJECTION INTRAMUSCULAR; INTRAVENOUS; SUBCUTANEOUS at 18:22

## 2024-12-16 RX ADMIN — POLYETHYLENE GLYCOL 3350 17 G: 17 POWDER, FOR SOLUTION ORAL at 20:21

## 2024-12-16 RX ADMIN — TAMSULOSIN HYDROCHLORIDE 0.4 MG: 0.4 CAPSULE ORAL at 09:55

## 2024-12-16 RX ADMIN — POLYETHYLENE GLYCOL 3350 17 G: 17 POWDER, FOR SOLUTION ORAL at 09:55

## 2024-12-16 RX ADMIN — SODIUM CHLORIDE 40 MG: 9 INJECTION INTRAMUSCULAR; INTRAVENOUS; SUBCUTANEOUS at 09:55

## 2024-12-16 ASSESSMENT — PAIN SCALES - GENERAL
PAINLEVEL_OUTOF10: 0
PAINLEVEL_OUTOF10: 0
PAINLEVEL_OUTOF10: 3
PAINLEVEL_OUTOF10: 0

## 2024-12-16 NOTE — ED NOTES
TRANSFER - OUT REPORT:  Verbal report given to ADA Awad on Bharat Betancur  being transferred to [facility] for routine progression of patient care     Report consisted of patient's Situation, Background, Assessment and Recommendations(SBAR).   Information from the following report(s) Nurse Handoff Report, ED Encounter Summary, ED SBAR, MAR, Recent Results, Cardiac Rhythm NSR, and Neuro Assessment was reviewed with the receiving nurse.  Opportunity for questions and clarification was provided.      Patient transported with:  Tech

## 2024-12-16 NOTE — CONSULTS
repeat CT revealing persistent stone. He was admitted for BRICE on CKD and urology consult.     On exam today, he reports resolution of left flank pain, persistent left lower quadrant abdominal pain.  He states he has received a soapsuds enema and MiraLAX without relief of his constipation.  Constipation is his primary complaint this morning.  He states this pain does not feel like stone pain any longer, more so constipation.     We discussed surgical intervention with left ureteral stent placement versus home with MET and close outpatient follow-up this week.  He declines either and is requesting for BM prior to surgical consideration or discharge as he feels this is no longer stone pain and he is more concerned about constipation.     Cr improving, 1.85 (2.35)     Subjective     Past Medical History  Past Medical History:   Diagnosis Date    Essential hypertension 2018    Hearing reduced 06/15/2022       Past Surgical History:   No past surgical history on file.    Medication:  Current Facility-Administered Medications   Medication Dose Route Frequency Provider Last Rate Last Admin    oxyCODONE-acetaminophen (PERCOCET) 5-325 MG per tablet 1 tablet  1 tablet Oral Q6H PRN Bharat Arriola MD        traZODone (DESYREL) tablet 100 mg  100 mg Oral Nightly Bharat Arriola MD   100 mg at 12/15/24 2031    meclizine (ANTIVERT) tablet 25 mg  25 mg Oral TID PRN Bharat Arriola MD        sodium chloride flush 0.9 % injection 5-40 mL  5-40 mL IntraVENous 2 times per day Bharat Arriola MD        sodium chloride flush 0.9 % injection 5-40 mL  5-40 mL IntraVENous PRN Bharat Arriola MD        0.9 % sodium chloride infusion   IntraVENous PRN Bharat Arriola MD        potassium chloride (KLOR-CON) extended release tablet 40 mEq  40 mEq Oral PRN Bharat Arriola MD        Or    potassium bicarb-citric acid (EFFER-K) effervescent tablet 40 mEq  40 mEq Oral PRN Bharat Arriola MD        Or    potassium

## 2024-12-16 NOTE — PLAN OF CARE
Problem: Pain  Goal: Verbalizes/displays adequate comfort level or baseline comfort level  12/16/2024 0239 by Anish Durant, RN  Outcome: Progressing  12/16/2024 0239 by Anish Durant, RN  Outcome: Progressing     Problem: Skin/Tissue Integrity  Goal: Absence of new skin breakdown  Description: 1.  Monitor for areas of redness and/or skin breakdown  2.  Assess vascular access sites hourly  3.  Every 4-6 hours minimum:  Change oxygen saturation probe site  4.  Every 4-6 hours:  If on nasal continuous positive airway pressure, respiratory therapy assess nares and determine need for appliance change or resting period.  Outcome: Progressing

## 2024-12-16 NOTE — CARE COORDINATION
Care Management Progress Note    Reason for Admission:   Ureterolithiasis [N20.1]  BRICE (acute kidney injury) (HCC) [N17.9]  Constipation, unspecified constipation type [K59.00]         Patient Admission Status: Inpatient  RUR:  8%  Hospitalization in the last 30 days (Readmission):  No        CM reviewed EMR and pt was discussed in IDRs.    Transition Plan of Care:  Urology is following for medical management   Plan is for pt to return home   Outpatient follow up  Pt will arrange his own transport home    No discharge needs indicated.  Johnathon

## 2024-12-17 VITALS
HEART RATE: 72 BPM | BODY MASS INDEX: 27.32 KG/M2 | OXYGEN SATURATION: 93 % | WEIGHT: 170 LBS | HEIGHT: 66 IN | DIASTOLIC BLOOD PRESSURE: 75 MMHG | TEMPERATURE: 98.1 F | SYSTOLIC BLOOD PRESSURE: 132 MMHG | RESPIRATION RATE: 16 BRPM

## 2024-12-17 PROBLEM — N17.9 AKI (ACUTE KIDNEY INJURY) (HCC): Status: RESOLVED | Noted: 2024-12-15 | Resolved: 2024-12-17

## 2024-12-17 LAB
ANION GAP SERPL CALC-SCNC: 5 MMOL/L (ref 2–12)
BACTERIA SPEC CULT: NORMAL
BUN SERPL-MCNC: 17 MG/DL (ref 6–20)
BUN/CREAT SERPL: 12 (ref 12–20)
CALCIUM SERPL-MCNC: 8.7 MG/DL (ref 8.5–10.1)
CHLORIDE SERPL-SCNC: 109 MMOL/L (ref 97–108)
CO2 SERPL-SCNC: 27 MMOL/L (ref 21–32)
CREAT SERPL-MCNC: 1.38 MG/DL (ref 0.7–1.3)
GLUCOSE SERPL-MCNC: 104 MG/DL (ref 65–100)
POTASSIUM SERPL-SCNC: 3.8 MMOL/L (ref 3.5–5.1)
SERVICE CMNT-IMP: NORMAL
SODIUM SERPL-SCNC: 141 MMOL/L (ref 136–145)

## 2024-12-17 PROCEDURE — 36415 COLL VENOUS BLD VENIPUNCTURE: CPT

## 2024-12-17 PROCEDURE — 6370000000 HC RX 637 (ALT 250 FOR IP): Performed by: INTERNAL MEDICINE

## 2024-12-17 PROCEDURE — 94761 N-INVAS EAR/PLS OXIMETRY MLT: CPT

## 2024-12-17 PROCEDURE — 2580000003 HC RX 258: Performed by: HOSPITALIST

## 2024-12-17 PROCEDURE — 80048 BASIC METABOLIC PNL TOTAL CA: CPT

## 2024-12-17 PROCEDURE — 6370000000 HC RX 637 (ALT 250 FOR IP): Performed by: HOSPITALIST

## 2024-12-17 RX ORDER — POLYETHYLENE GLYCOL 3350 17 G/17G
17 POWDER, FOR SOLUTION ORAL DAILY PRN
Qty: 30 PACKET | Refills: 0 | Status: SHIPPED | OUTPATIENT
Start: 2024-12-17 | End: 2025-01-16

## 2024-12-17 RX ORDER — PANTOPRAZOLE SODIUM 40 MG/1
40 TABLET, DELAYED RELEASE ORAL
Qty: 30 TABLET | Refills: 3 | Status: SHIPPED | OUTPATIENT
Start: 2024-12-18

## 2024-12-17 RX ORDER — TAMSULOSIN HYDROCHLORIDE 0.4 MG/1
0.4 CAPSULE ORAL DAILY
Qty: 30 CAPSULE | Refills: 3 | Status: SHIPPED | OUTPATIENT
Start: 2024-12-17

## 2024-12-17 RX ORDER — CIPROFLOXACIN 500 MG/1
500 TABLET, FILM COATED ORAL 2 TIMES DAILY
Qty: 10 TABLET | Refills: 0 | Status: SHIPPED | OUTPATIENT
Start: 2024-12-17 | End: 2024-12-22

## 2024-12-17 RX ADMIN — TAMSULOSIN HYDROCHLORIDE 0.4 MG: 0.4 CAPSULE ORAL at 11:36

## 2024-12-17 RX ADMIN — SODIUM CHLORIDE, PRESERVATIVE FREE 10 ML: 5 INJECTION INTRAVENOUS at 08:00

## 2024-12-17 RX ADMIN — POLYETHYLENE GLYCOL 3350 17 G: 17 POWDER, FOR SOLUTION ORAL at 11:36

## 2024-12-17 ASSESSMENT — PAIN SCALES - GENERAL
PAINLEVEL_OUTOF10: 0
PAINLEVEL_OUTOF10: 0

## 2024-12-17 NOTE — PLAN OF CARE
Problem: Pain  Goal: Verbalizes/displays adequate comfort level or baseline comfort level  12/17/2024 1127 by Billy House RN  Outcome: Adequate for Discharge  12/17/2024 0259 by Agnieszka Munguia RN  Outcome: Progressing  Flowsheets (Taken 12/16/2024 2010)  Verbalizes/displays adequate comfort level or baseline comfort level:   Encourage patient to monitor pain and request assistance   Assess pain using appropriate pain scale   Administer analgesics based on type and severity of pain and evaluate response     Problem: Skin/Tissue Integrity  Goal: Absence of new skin breakdown  Description: 1.  Monitor for areas of redness and/or skin breakdown  2.  Assess vascular access sites hourly  3.  Every 4-6 hours minimum:  Change oxygen saturation probe site  4.  Every 4-6 hours:  If on nasal continuous positive airway pressure, respiratory therapy assess nares and determine need for appliance change or resting period.  12/17/2024 1127 by Billy House, ADA  Outcome: Adequate for Discharge  12/17/2024 0259 by Agnieszka Munguia, RN  Outcome: Progressing

## 2024-12-17 NOTE — DISCHARGE INSTRUCTIONS
HOSPITALIST DISCHARGE INSTRUCTIONS  NAME:  Bharat Betancur   :  1965   MRN:  [id number]     Date/Time:  2024 9:27 AM    ADMIT DATE: 12/15/2024     DISCHARGE DATE: 2024     DISCHARGE DIAGNOSIS:  Kidney stone    DISCHARGE INSTRUCTIONS:  Thank you for allowing us to participate in your care. Your discharging Hospitalist is Mark Mercado MD. You were admitted for evaluation and treatment of the above.     Hydronephrosis with urinary obstruction due to ureteral calculus POA: he has a 2mm calculous distal right ureter. Seen by Urology who recommend medical management for now. Monitor here given rising SCR. Continue Flomax, empiric IV Ceftriaxone. NPO after midnight. IV fluids     BRICE (acute kidney injury) POA: partly from the above issue. SCR improving. Continue IV fluids. Monitor renal function.      Constipation POA: severe. Give MOM, adjust Miralax dose. Give enema if no relief     HTN (hypertension), benign POA: BP stable. Continue Amlodipine     GERD (gastroesophageal reflux disease) POA: continue Pantoprazole     Rheumatoid arthritis POA: stable. Outpatient follow up      MEDICATIONS:    It is important that you take the medication exactly as they are prescribed.   Keep your medication in the bottles provided by the pharmacist and keep a list of the medication names, dosages, and times to be taken in your wallet.   Do not take other medications without consulting your doctor.             If you experience any of the following symptoms then please call your primary care physician or return to the emergency room if you cannot get hold of your doctor:  Fever, chills, nausea, vomiting, diarrhea, change in mentation, falling, bleeding, shortness of breath    Follow Up:  Please call the below provider to arrange hospital follow up appointment      [facility] [address], [address]  [place] [id number] [phone]  Follow up on 2024  220PM with Dr. Geronimo      For

## 2024-12-17 NOTE — PLAN OF CARE
Problem: Pain  Goal: Verbalizes/displays adequate comfort level or baseline comfort level  Outcome: Progressing  Flowsheets (Taken 12/16/2024 2010)  Verbalizes/displays adequate comfort level or baseline comfort level:   Encourage patient to monitor pain and request assistance   Assess pain using appropriate pain scale   Administer analgesics based on type and severity of pain and evaluate response     Problem: Skin/Tissue Integrity  Goal: Absence of new skin breakdown  Description: 1.  Monitor for areas of redness and/or skin breakdown  2.  Assess vascular access sites hourly  3.  Every 4-6 hours minimum:  Change oxygen saturation probe site  4.  Every 4-6 hours:  If on nasal continuous positive airway pressure, respiratory therapy assess nares and determine need for appliance change or resting period.  Outcome: Progressing

## 2024-12-17 NOTE — PROGRESS NOTES
Urology Progress Note    Patient: Bharat Betancur MRN: [id number]  SSN: [id number]    YOB: 1965  Age: 59 y.o.  Sex: male        Assessment:     Sitting in bed, NAD  Primary complaint is constipation, denies bowel movement  Denies active stone pain.  He has been straining urine and denies passage of stone.  His renal function is returning to baseline.     mL    Plan:     BRICE on CKD, 2 mm left distal ureteral stone with associated hydronephrosis, concern for UTI, constipation   -Broad-spectrum antibiotics. Urine culture sent after antibiotics started.  Follow.  Complete empiric course of antibiotics upon discharge  -Bowel cleanse per primary team  -Given his renal function is approaching baseline, he is asymptomatic from stone pain we discussed home with continued MET, antibiotics, straining urine.     I will arrange outpatient follow-up in place and discharge section of chart once available.  He is aware to return to the emergency department if he begins with fever greater than 101 for consideration of ureteral stent placement if he has yet to pass stone.     ROS:     Denies fevers  Reports diffuse abdominal pain  Denies hematuria, frequency    Objective:     /75   Pulse 72   Temp 98.1 °F (36.7 °C) (Oral)   Resp 16   Ht 1.676 m (5' 6\")   Wt 77.1 kg (170 lb)   SpO2 93%   BMI 27.44 kg/m²       Intake/Output Summary (Last 24 hours) at 12/17/2024 0857  Last data filed at 12/17/2024 0621  Gross per 24 hour   Intake 1375 ml   Output --   Net 1375 ml       Physical Exam  General: NAD  Respiratory: no distress, room air  Abdomen: softly distended  : no CVA tenderness, voiding independently  Neuro: Appropriate, no focal neurological deficits    Labs reviewed, December 17, 2024  Recent Results (from the past 24 hour(s))   Basic Metabolic Panel    Collection Time: 12/17/24  4:36 AM   Result Value Ref Range    Sodium 141 136 - 145 mmol/L    Potassium 
Pharmacy Dosing Services: 12/16/24    The pharmacist has determined that this patient meets P & T approved criteria for conversion from IV to oral therapy for the following medication: Pantoprazole      The pharmacist has written the following order for the patient: 40mg po daily  The pharmacist will continue to monitor the patient's status and advise the physician if conversion back to IV therapy is recommended.    Signed Yenny iY RPH Contact information: [phone]    
\"HDL\"    Imaging data reviewed:    CT ABDOMEN PELVIS WO CONTRAST Additional Contrast? None    Result Date: 12/15/2024  Redemonstration of left-sided hydroureteronephrosis with 2 mm calculus at the distal left ureter with interval increase in adjacent inflammatory changes and new adjacent fluid as detailed above with differential including inflammatory changes secondary to obstructive uropathy and/or ascending infectious process incompletely evaluated on noncontrast examination. Clinical correlation is advised. Electronically signed by Saroj Carson    XR ABDOMEN (KUB) (SINGLE AP VIEW)    Result Date: 12/15/2024  Large stool burden in the right colon. Electronically signed by Eugene Arriola    CT ABDOMEN PELVIS WO CONTRAST Additional Contrast? None    Result Date: 12/14/2024  Mild left-sided hydroureteronephrosis with 2 mm calculus of the distal left ureter.  Incidental and/or nonemergent findings are as described in detail above. Electronically signed by ASHLEY ARREAGA    Cultures, Imaging studies reviewed and reports noted, Telemetry reviewed and independently interpreted by me and available notes from other care providers - all reviewed by me on: 12/16/2024    Assessment and Plan:    Hydronephrosis with urinary obstruction due to ureteral calculus POA: he has a 2mm calculous distal right ureter. Seen by Urology who recommend medical management for now. Monitor here given rising SCR. Continue Flomax, empiric IV Ceftriaxone. NPO after midnight. IV fluids    BRICE (acute kidney injury) POA: partly from the above issue. SCR improving. Continue IV fluids. Monitor renal function.     Constipation POA: severe. Give MOM, adjust Miralax dose. Give enema if no relief    HTN (hypertension), benign POA: BP stable. Continue Amlodipine    GERD (gastroesophageal reflux disease) POA: continue Pantoprazole    Rheumatoid arthritis POA: stable. Outpatient follow up    Care Plan discussed with: Patient, Nursing, CM     Prophylaxis:

## 2024-12-17 NOTE — DISCHARGE SUMMARY
1 tablet by mouth           STOP taking these medications       ondansetron (ZOFRAN-ODT) 4 MG disintegrating tablet Comments:   Reason for Stopping:         oxyCODONE-acetaminophen (PERCOCET) 5-325 MG per tablet Comments:   Reason for Stopping:         Adalimumab (HUMIRA PEN) 40 MG/0.4ML PNKT Comments:   Reason for Stopping:         amLODIPine (NORVASC) 5 MG tablet Comments:   Reason for Stopping:         diazePAM (VALIUM) 10 MG tablet Comments:   Reason for Stopping:         diclofenac (VOLTAREN) 75 MG EC tablet Comments:   Reason for Stopping:             Activity: activity as tolerated  Diet: regular diet  Wound Care: none needed    Follow-up with Gordy Chan PA in 1 week.  Follow-up tests/labs FU with urology     Approximate time spent in patient care on day of discharge: 50 min     Signed:  Mark Mercado MD  12/17/2024  9:50 AM

## 2024-12-19 ENCOUNTER — HOSPITAL ENCOUNTER (EMERGENCY)
Facility: HOSPITAL | Age: 59
Discharge: HOME | End: 2024-12-19
Attending: STUDENT IN AN ORGANIZED HEALTH CARE EDUCATION/TRAINING PROGRAM
Payer: COMMERCIAL

## 2024-12-19 ENCOUNTER — APPOINTMENT (OUTPATIENT)
Facility: HOSPITAL | Age: 59
End: 2024-12-19
Payer: COMMERCIAL

## 2024-12-19 VITALS
HEART RATE: 84 BPM | BODY MASS INDEX: 27.32 KG/M2 | SYSTOLIC BLOOD PRESSURE: 120 MMHG | DIASTOLIC BLOOD PRESSURE: 70 MMHG | RESPIRATION RATE: 17 BRPM | OXYGEN SATURATION: 97 % | TEMPERATURE: 97.6 F | HEIGHT: 66 IN | WEIGHT: 170 LBS

## 2024-12-19 DIAGNOSIS — K59.03 DRUG-INDUCED CONSTIPATION: Primary | ICD-10-CM

## 2024-12-19 PROCEDURE — 74018 RADEX ABDOMEN 1 VIEW: CPT

## 2024-12-19 PROCEDURE — 99283 EMERGENCY DEPT VISIT LOW MDM: CPT

## 2024-12-19 RX ORDER — DOCUSATE SODIUM 100 MG/1
100 CAPSULE, LIQUID FILLED ORAL 2 TIMES DAILY
Qty: 30 CAPSULE | Refills: 0 | Status: SHIPPED | OUTPATIENT
Start: 2024-12-19 | End: 2024-12-19

## 2024-12-19 RX ORDER — DOCUSATE SODIUM 100 MG/1
100 CAPSULE, LIQUID FILLED ORAL 2 TIMES DAILY
Qty: 30 CAPSULE | Refills: 0 | Status: SHIPPED | OUTPATIENT
Start: 2024-12-19 | End: 2025-01-03

## 2024-12-19 ASSESSMENT — PAIN DESCRIPTION - LOCATION
LOCATION: ABDOMEN
LOCATION: ABDOMEN

## 2024-12-19 ASSESSMENT — PAIN SCALES - GENERAL
PAINLEVEL_OUTOF10: 3
PAINLEVEL_OUTOF10: 5

## 2024-12-19 ASSESSMENT — PAIN - FUNCTIONAL ASSESSMENT: PAIN_FUNCTIONAL_ASSESSMENT: 0-10

## 2024-12-19 NOTE — ED TRIAGE NOTES
Patient arrives ambulatory to the ED for complaints of constipation for 6 days. Last BM 12/13    Denies fever, chills, N/V/D. Denies urinary symptoms.     Was recently discharged Tuesday for kidney stones over the weekend. Has had several narcotic prescriptions linked to that.     PMH HTN

## 2024-12-19 NOTE — ED PROVIDER NOTES
(12/15/2024)    PRAPARE - Transportation     Lack of Transportation (Medical): No     Lack of Transportation (Non-Medical): No   Housing Stability: Low Risk  (12/15/2024)    Housing Stability Vital Sign     Unable to Pay for Housing in the Last Year: No     Number of Times Moved in the Last Year: 0     Homeless in the Last Year: No         PHYSICAL EXAM       ED Triage Vitals [12/19/24 1225]   BP Systolic BP Percentile Diastolic BP Percentile Temp Temp Source Pulse Respirations SpO2   123/77 -- -- 97.6 °F (36.4 °C) Oral (!) 112 18 95 %      Height Weight - Scale         1.676 m (5' 6\") 77.1 kg (170 lb)             Body mass index is 27.44 kg/m².    Physical Exam        EMERGENCY DEPARTMENT COURSE and DIFFERENTIAL DIAGNOSIS/MDM:   Vitals:    Vitals:    12/19/24 1225   BP: 123/77   Pulse: (!) 112   Resp: 18   Temp: 97.6 °F (36.4 °C)   TempSrc: Oral   SpO2: 95%   Weight: 77.1 kg (170 lb)   Height: 1.676 m (5' 6\")         Medical Decision Making  HPI: 59-year-old male presents with constipation for the past 6 days, unable to have a bowel movement since Saturday. Recently treated for a kidney stone, received two rounds of Dilaudid in the ER and takes Percocet at home. Has used Fleet's enema, Miralax, and Milk of Magnesia without relief. Positive for nausea, but no vomiting, fever, or chills.    DD: Opioid-induced constipation, bowel obstruction, ileus, gastrointestinal (GI) dysmotility, recent kidney stone, dehydration, electrolyte imbalance, diverticulitis.        Amount and/or Complexity of Data Reviewed  Radiology: ordered.    Risk  OTC drugs.            REASSESSMENT          CONSULTS:  None    PROCEDURES:     Procedures    Labs Reviewed - No data to display    XR ABDOMEN (KUB) (SINGLE AP VIEW)   Final Result        2:50 PM  Pt has been reexamined.  Pt has no new complaints, changes or physical findings. Care plan outlined and precautions discussed. All available results were reviewed with pt. All medications were

## 2024-12-30 ASSESSMENT — ENCOUNTER SYMPTOMS
SINUS PRESSURE: 0
VOMITING: 0
COUGH: 0
ABDOMINAL PAIN: 0
COLOR CHANGE: 0
SHORTNESS OF BREATH: 0
ABDOMINAL DISTENTION: 1
CONSTIPATION: 1
NAUSEA: 1
SINUS PAIN: 0

## 2025-01-02 ENCOUNTER — OFFICE VISIT (OUTPATIENT)
Age: 60
End: 2025-01-02
Payer: COMMERCIAL

## 2025-01-02 VITALS
OXYGEN SATURATION: 95 % | DIASTOLIC BLOOD PRESSURE: 82 MMHG | SYSTOLIC BLOOD PRESSURE: 140 MMHG | HEART RATE: 97 BPM | RESPIRATION RATE: 20 BRPM

## 2025-01-02 DIAGNOSIS — G25.0 ESSENTIAL TREMOR: Primary | ICD-10-CM

## 2025-01-02 PROCEDURE — 3077F SYST BP >= 140 MM HG: CPT

## 2025-01-02 PROCEDURE — 3079F DIAST BP 80-89 MM HG: CPT

## 2025-01-02 PROCEDURE — 99212 OFFICE O/P EST SF 10 MIN: CPT

## 2025-01-02 NOTE — PROGRESS NOTES
Bharat Betancur is a 59 y.o. male who presents with the following  Chief Complaint   Patient presents with    Follow-up     ?  Parkinson's disease        HPI  Patient is here today for follow-up.  Patient was last seen September 26, 2022 by Dr. Anderson for right upper extremity paresthesias and vertigo.  Today the patient presents stating that his father had Parkinson's disease and that he has been noticing a left hand tremor for over a year now.  The patient states that this hand tremor only occurs with activity such as eating or drinking, sometimes writing.  He said that at first he thought that it was his shoulders as he has had bilateral shoulder surgery in the past but now wonders if it is Parkinson's.  He denies tremor at rest and any significant stiffness.  He says some days the tremor does not occur at all and it is not very bothersome to him.  Of note, the patient says that right now he is dealing with kidney stones.  Allergies   Allergen Reactions    Cherry Itching       Current Outpatient Medications   Medication Sig Dispense Refill    TRAMADOL HCL PO Take by mouth.      docusate sodium (COLACE) 100 MG capsule Take 1 capsule by mouth 2 times daily for 15 days 30 capsule 0    tamsulosin (FLOMAX) 0.4 MG capsule Take 1 capsule by mouth daily 30 capsule 3    pantoprazole (PROTONIX) 40 MG tablet Take 1 tablet by mouth every morning (before breakfast) 30 tablet 3    polyethylene glycol (GLYCOLAX) 17 g packet Take 1 packet by mouth daily as needed for Constipation 30 packet 0    meclizine (ANTIVERT) 25 MG tablet 1 TABLET AS NEEDED ORALLY 1-3X PER DAY 10 DAYS      traZODone (DESYREL) 100 MG tablet Take 1 tablet by mouth (Patient not taking: Reported on 1/2/2025)       No current facility-administered medications for this visit.        Social History     Tobacco Use   Smoking Status Never   Smokeless Tobacco Never       Past Medical History:   Diagnosis Date    Essential hypertension 2018    Hearing reduced 06/15/2022

## 2025-01-02 NOTE — PROGRESS NOTES
Parkinson's disease- suspected for bout the last year   No shuffling gait but the shaking hand in his left hand he has a hard time holding onto things   Only in the left hand

## 2025-01-03 ENCOUNTER — HOSPITAL ENCOUNTER (OUTPATIENT)
Facility: HOSPITAL | Age: 60
Discharge: HOME | End: 2025-01-03
Attending: UROLOGY
Payer: COMMERCIAL

## 2025-01-03 DIAGNOSIS — R97.8 ABNORMAL TUMOR MARKERS: ICD-10-CM

## 2025-01-03 PROCEDURE — 72197 MRI PELVIS W/O & W/DYE: CPT

## 2025-01-03 PROCEDURE — 6360000004 HC RX CONTRAST MEDICATION: Performed by: UROLOGY

## 2025-01-03 PROCEDURE — A9579 GAD-BASE MR CONTRAST NOS,1ML: HCPCS | Performed by: UROLOGY

## 2025-01-03 RX ORDER — 0.9 % SODIUM CHLORIDE 0.9 %
500 INTRAVENOUS SOLUTION INTRAVENOUS ONCE
OUTPATIENT
Start: 2025-01-03 | End: 2025-01-03

## 2025-01-03 RX ORDER — SODIUM CHLORIDE 0.9 % (FLUSH) 0.9 %
5-40 SYRINGE (ML) INJECTION 2 TIMES DAILY
Status: DISPENSED | OUTPATIENT
Start: 2025-01-03

## 2025-01-03 RX ADMIN — GADOTERIDOL 15 ML: 279.3 INJECTION, SOLUTION INTRAVENOUS at 18:26

## 2025-05-15 ENCOUNTER — OFFICE VISIT (OUTPATIENT)
Age: 60
End: 2025-05-15

## 2025-05-15 VITALS
WEIGHT: 170 LBS | BODY MASS INDEX: 27.44 KG/M2 | OXYGEN SATURATION: 96 % | TEMPERATURE: 97.2 F | DIASTOLIC BLOOD PRESSURE: 80 MMHG | SYSTOLIC BLOOD PRESSURE: 114 MMHG | HEART RATE: 108 BPM

## 2025-05-15 DIAGNOSIS — R68.89 FLU-LIKE SYMPTOMS: Primary | ICD-10-CM

## 2025-05-15 RX ORDER — OLMESARTAN MEDOXOMIL 5 MG/1
5 TABLET ORAL DAILY
COMMUNITY

## 2025-05-15 RX ORDER — BENZONATATE 100 MG/1
100-200 CAPSULE ORAL 3 TIMES DAILY PRN
Qty: 60 CAPSULE | Refills: 0 | Status: SHIPPED | OUTPATIENT
Start: 2025-05-15 | End: 2025-05-25

## 2025-05-15 RX ORDER — UPADACITINIB 15 MG/1
TABLET, EXTENDED RELEASE ORAL
COMMUNITY

## 2025-05-15 NOTE — PROGRESS NOTES
Bharat Betancur (:  1965) is a 60 y.o. male,New patient, here for evaluation of the following chief complaint(s):  Other (Fatigue,, cough, running nose, body aches since Monday/)      Assessment & Plan :  Visit Diagnoses and Associated Orders         Flu-like symptoms    -  Primary              -POC Flu and COVID negative here today. Overall symptoms are mild in nature, exam is reassuring. Vitals are all WNL. I recommend continued supportive care, info provided in the AVS.   -Red flag signs and strict return / ER precautions were discussed, and the patient expressed understanding and agreement to the plan. All questions were answered.       Follow up in 5 days if symptoms persist or if symptoms worsen.       Subjective :  Fatigue, cough, running nose, body aches since Monday. He has been off for work Tuesday and Wednesday. He states he has been unable to eat much. He is continuing to drink plenty of water.     He states he went to a buffet on  and feels he may have come in contact with someone sick. He notes the fatigue started Monday afternoon. He denies fever. He has a history of psoriatic arthritis taking Rinvoq. He states he did skip one dose (was told to skip a dose when he is sick). He has been taking tylenol cold and flu for his symptoms. He has a history of kidney injury (being on naproxen and diclofenac for years). He reports being up during the night coughing.       History provided by:  Patient   used: No                Vitals:    05/15/25 1033   BP: 114/80   BP Site: Left Upper Arm   Patient Position: Sitting   BP Cuff Size: Medium Adult   Pulse: (!) 108   Temp: 97.2 °F (36.2 °C)   TempSrc: Oral   SpO2: 96%   Weight: 77.1 kg (170 lb)       No results found for this visit on 05/15/25.      Objective   Physical Exam  Vitals and nursing note reviewed.   Constitutional:       General: He is not in acute distress.     Appearance: Normal appearance. He is not ill-appearing.

## 2025-05-15 NOTE — PATIENT INSTRUCTIONS
Jess Mr. Betancur,    I recommend a very aggressive regimen of vitamins and other natural remedies.  Start taking the following, and probably best to take it with a small meal or with food in your stomach.    Vitamin C oral tablets 500 mg, taking 1 tablet twice daily.    Zinc at 50 mg tablets, taking 1 tablet every morning.  Vitamin D3 at 1000 IU tablet, taking 1 tablet every evening.    Mucinex (guaifenesin) extra strength tablets, taking 1 tablet twice daily.  (Note, take plain old Mucinex and make sure there is nothing else on the ingredient list other than guaifenesin.  The extra-strength 12-hour version is the best).  This will help the most with head and chest congestion.    Use honey as a primary cough syrup.  You can certainly buy some of the natural honey based over-the-counter remedies such as Zarbee's, however, I personally take a jar of honey and eat a tablespoon or 2 as often as I can to help with my cough.  You could do 2 tablespoons every hour all day long if you wanted to.  There have been randomized controlled double blinded placebo based studies which have put honey against every other version of prescription cough medicine, and honey has always been proven to be more effective.    For body aches and fevers, I prefer Tylenol.  Take the 500 mg extra strength tablets, and take 2 tablets (1000 mg) every 6 hours.  This is the maximum dose, you can never have more than 4000 mg in a 24-hour period.      Please let me know if you have any issues and I will be happy to discuss further.  I would do the above for at least 1 week, but really as long as you are continuing to feel ill.